# Patient Record
Sex: MALE | Race: WHITE | Employment: OTHER | URBAN - METROPOLITAN AREA
[De-identification: names, ages, dates, MRNs, and addresses within clinical notes are randomized per-mention and may not be internally consistent; named-entity substitution may affect disease eponyms.]

---

## 2017-07-23 PROBLEM — J44.1 CHRONIC OBSTRUCTIVE PULMONARY DISEASE WITH ACUTE EXACERBATION (HCC): Status: ACTIVE | Noted: 2017-07-23

## 2017-07-23 PROBLEM — R07.9 CHEST PAIN: Status: ACTIVE | Noted: 2017-07-23

## 2020-08-01 ENCOUNTER — APPOINTMENT (OUTPATIENT)
Dept: GENERAL RADIOLOGY | Age: 65
DRG: 378 | End: 2020-08-01
Payer: MEDICARE

## 2020-08-01 ENCOUNTER — HOSPITAL ENCOUNTER (INPATIENT)
Age: 65
LOS: 4 days | Discharge: HOME | DRG: 378 | End: 2020-08-05
Attending: EMERGENCY MEDICINE | Admitting: INTERNAL MEDICINE
Payer: MEDICARE

## 2020-08-01 ENCOUNTER — APPOINTMENT (OUTPATIENT)
Dept: CT IMAGING | Age: 65
DRG: 378 | End: 2020-08-01
Payer: MEDICARE

## 2020-08-01 PROBLEM — K92.2 GI BLEED: Status: ACTIVE | Noted: 2020-08-01

## 2020-08-01 LAB
ABO/RH: NORMAL
ALBUMIN SERPL-MCNC: 3.9 G/DL (ref 3.5–5.2)
ALP BLD-CCNC: 46 U/L (ref 40–129)
ALT SERPL-CCNC: 14 U/L (ref 0–40)
AMMONIA: 45 UMOL/L (ref 16–60)
ANION GAP SERPL CALCULATED.3IONS-SCNC: 17 MMOL/L (ref 7–16)
ANTIBODY SCREEN: NORMAL
APTT: 26.7 SEC (ref 24.5–35.1)
AST SERPL-CCNC: 15 U/L (ref 0–39)
BACTERIA: NORMAL /HPF
BILIRUB SERPL-MCNC: 0.4 MG/DL (ref 0–1.2)
BILIRUBIN DIRECT: <0.2 MG/DL (ref 0–0.3)
BILIRUBIN URINE: NEGATIVE
BILIRUBIN, INDIRECT: NORMAL MG/DL (ref 0–1)
BLOOD, URINE: ABNORMAL
BUN BLDV-MCNC: 72 MG/DL (ref 8–23)
CALCIUM SERPL-MCNC: 9.3 MG/DL (ref 8.6–10.2)
CHLORIDE BLD-SCNC: 104 MMOL/L (ref 98–107)
CHP ED QC CHECK: YES
CLARITY: CLEAR
CO2: 19 MMOL/L (ref 22–29)
COLOR: YELLOW
CREAT SERPL-MCNC: 0.7 MG/DL (ref 0.7–1.2)
GFR AFRICAN AMERICAN: >60
GFR NON-AFRICAN AMERICAN: >60 ML/MIN/1.73
GLUCOSE BLD-MCNC: 111 MG/DL (ref 74–99)
GLUCOSE BLD-MCNC: 127 MG/DL
GLUCOSE URINE: NEGATIVE MG/DL
HCT VFR BLD CALC: 35.6 % (ref 37–54)
HCT VFR BLD CALC: 41.9 % (ref 37–54)
HEMOGLOBIN: 12.1 G/DL (ref 12.5–16.5)
HEMOGLOBIN: 14.5 G/DL (ref 12.5–16.5)
IMMATURE RETIC FRACT: 10.1 % (ref 2.3–13.4)
INR BLD: 1
INR BLD: 1.1
KETONES, URINE: ABNORMAL MG/DL
LACTIC ACID: 2.8 MMOL/L (ref 0.5–2.2)
LEUKOCYTE ESTERASE, URINE: NEGATIVE
LIPASE: 14 U/L (ref 13–60)
MAGNESIUM: 2.1 MG/DL (ref 1.6–2.6)
MCH RBC QN AUTO: 33.1 PG (ref 26–35)
MCHC RBC AUTO-ENTMCNC: 34.6 % (ref 32–34.5)
MCV RBC AUTO: 95.7 FL (ref 80–99.9)
METER GLUCOSE: 126 MG/DL (ref 74–99)
NITRITE, URINE: NEGATIVE
PDW BLD-RTO: 13.2 FL (ref 11.5–15)
PH UA: 5.5 (ref 5–9)
PHOSPHORUS: 2.3 MG/DL (ref 2.5–4.5)
PLATELET # BLD: 232 E9/L (ref 130–450)
PMV BLD AUTO: 12.6 FL (ref 7–12)
POTASSIUM SERPL-SCNC: 3.7 MMOL/L (ref 3.5–5)
PROCALCITONIN: 0.07 NG/ML (ref 0–0.08)
PROTEIN UA: NEGATIVE MG/DL
PROTHROMBIN TIME: 12 SEC (ref 9.3–12.4)
PROTHROMBIN TIME: 12.4 SEC (ref 9.3–12.4)
RBC # BLD: 4.38 E12/L (ref 3.8–5.8)
RBC UA: NORMAL /HPF (ref 0–2)
RETIC HGB EQUIVALENT: 37.6 PG (ref 28.2–36.6)
RETICULOCYTE ABSOLUTE COUNT: 0.09 E12/L
RETICULOCYTE COUNT PCT: 2.5 % (ref 0.4–1.9)
SODIUM BLD-SCNC: 140 MMOL/L (ref 132–146)
SPECIFIC GRAVITY UA: <=1.005 (ref 1–1.03)
TOTAL CK: 106 U/L (ref 20–200)
TOTAL PROTEIN: 7.2 G/DL (ref 6.4–8.3)
TROPONIN: <0.01 NG/ML (ref 0–0.03)
UROBILINOGEN, URINE: 0.2 E.U./DL
WBC # BLD: 17.3 E9/L (ref 4.5–11.5)
WBC UA: NORMAL /HPF (ref 0–5)

## 2020-08-01 PROCEDURE — 99285 EMERGENCY DEPT VISIT HI MDM: CPT

## 2020-08-01 PROCEDURE — 86900 BLOOD TYPING SEROLOGIC ABO: CPT

## 2020-08-01 PROCEDURE — 83550 IRON BINDING TEST: CPT

## 2020-08-01 PROCEDURE — 81001 URINALYSIS AUTO W/SCOPE: CPT

## 2020-08-01 PROCEDURE — 6360000002 HC RX W HCPCS: Performed by: EMERGENCY MEDICINE

## 2020-08-01 PROCEDURE — 83540 ASSAY OF IRON: CPT

## 2020-08-01 PROCEDURE — 51798 US URINE CAPACITY MEASURE: CPT

## 2020-08-01 PROCEDURE — C9113 INJ PANTOPRAZOLE SODIUM, VIA: HCPCS | Performed by: EMERGENCY MEDICINE

## 2020-08-01 PROCEDURE — 96374 THER/PROPH/DIAG INJ IV PUSH: CPT

## 2020-08-01 PROCEDURE — 74177 CT ABD & PELVIS W/CONTRAST: CPT

## 2020-08-01 PROCEDURE — 94640 AIRWAY INHALATION TREATMENT: CPT

## 2020-08-01 PROCEDURE — 1200000000 HC SEMI PRIVATE

## 2020-08-01 PROCEDURE — 93005 ELECTROCARDIOGRAM TRACING: CPT | Performed by: EMERGENCY MEDICINE

## 2020-08-01 PROCEDURE — 86850 RBC ANTIBODY SCREEN: CPT

## 2020-08-01 PROCEDURE — 82962 GLUCOSE BLOOD TEST: CPT

## 2020-08-01 PROCEDURE — 85027 COMPLETE CBC AUTOMATED: CPT

## 2020-08-01 PROCEDURE — 82746 ASSAY OF FOLIC ACID SERUM: CPT

## 2020-08-01 PROCEDURE — 82728 ASSAY OF FERRITIN: CPT

## 2020-08-01 PROCEDURE — 83605 ASSAY OF LACTIC ACID: CPT

## 2020-08-01 PROCEDURE — 82550 ASSAY OF CK (CPK): CPT

## 2020-08-01 PROCEDURE — 82140 ASSAY OF AMMONIA: CPT

## 2020-08-01 PROCEDURE — 84484 ASSAY OF TROPONIN QUANT: CPT

## 2020-08-01 PROCEDURE — 85045 AUTOMATED RETICULOCYTE COUNT: CPT

## 2020-08-01 PROCEDURE — 6360000002 HC RX W HCPCS: Performed by: INTERNAL MEDICINE

## 2020-08-01 PROCEDURE — 84145 PROCALCITONIN (PCT): CPT

## 2020-08-01 PROCEDURE — 71046 X-RAY EXAM CHEST 2 VIEWS: CPT

## 2020-08-01 PROCEDURE — 85610 PROTHROMBIN TIME: CPT

## 2020-08-01 PROCEDURE — 86901 BLOOD TYPING SEROLOGIC RH(D): CPT

## 2020-08-01 PROCEDURE — 80048 BASIC METABOLIC PNL TOTAL CA: CPT

## 2020-08-01 PROCEDURE — 36415 COLL VENOUS BLD VENIPUNCTURE: CPT

## 2020-08-01 PROCEDURE — 94667 MNPJ CHEST WALL 1ST: CPT

## 2020-08-01 PROCEDURE — 2580000003 HC RX 258: Performed by: NURSE PRACTITIONER

## 2020-08-01 PROCEDURE — 84100 ASSAY OF PHOSPHORUS: CPT

## 2020-08-01 PROCEDURE — 70450 CT HEAD/BRAIN W/O DYE: CPT

## 2020-08-01 PROCEDURE — 72125 CT NECK SPINE W/O DYE: CPT

## 2020-08-01 PROCEDURE — 80076 HEPATIC FUNCTION PANEL: CPT

## 2020-08-01 PROCEDURE — 82607 VITAMIN B-12: CPT

## 2020-08-01 PROCEDURE — 83735 ASSAY OF MAGNESIUM: CPT

## 2020-08-01 PROCEDURE — 83690 ASSAY OF LIPASE: CPT

## 2020-08-01 PROCEDURE — 6360000002 HC RX W HCPCS: Performed by: NURSE PRACTITIONER

## 2020-08-01 PROCEDURE — 85018 HEMOGLOBIN: CPT

## 2020-08-01 PROCEDURE — 94760 N-INVAS EAR/PLS OXIMETRY 1: CPT

## 2020-08-01 PROCEDURE — 6360000004 HC RX CONTRAST MEDICATION: Performed by: RADIOLOGY

## 2020-08-01 PROCEDURE — 85730 THROMBOPLASTIN TIME PARTIAL: CPT

## 2020-08-01 PROCEDURE — C9113 INJ PANTOPRAZOLE SODIUM, VIA: HCPCS | Performed by: NURSE PRACTITIONER

## 2020-08-01 PROCEDURE — 94669 MECHANICAL CHEST WALL OSCILL: CPT

## 2020-08-01 RX ORDER — SODIUM CHLORIDE 0.9 % (FLUSH) 0.9 %
10 SYRINGE (ML) INJECTION PRN
Status: DISCONTINUED | OUTPATIENT
Start: 2020-08-01 | End: 2020-08-03 | Stop reason: SDUPTHER

## 2020-08-01 RX ORDER — ACETAMINOPHEN 650 MG/1
650 SUPPOSITORY RECTAL EVERY 6 HOURS PRN
Status: DISCONTINUED | OUTPATIENT
Start: 2020-08-01 | End: 2020-08-05 | Stop reason: HOSPADM

## 2020-08-01 RX ORDER — ONDANSETRON 2 MG/ML
4 INJECTION INTRAMUSCULAR; INTRAVENOUS EVERY 6 HOURS PRN
Status: DISCONTINUED | OUTPATIENT
Start: 2020-08-01 | End: 2020-08-01

## 2020-08-01 RX ORDER — SODIUM CHLORIDE AND POTASSIUM CHLORIDE .9; .15 G/100ML; G/100ML
SOLUTION INTRAVENOUS CONTINUOUS
Status: DISCONTINUED | OUTPATIENT
Start: 2020-08-01 | End: 2020-08-03

## 2020-08-01 RX ORDER — SODIUM CHLORIDE 0.9 % (FLUSH) 0.9 %
10 SYRINGE (ML) INJECTION PRN
Status: DISCONTINUED | OUTPATIENT
Start: 2020-08-01 | End: 2020-08-05 | Stop reason: HOSPADM

## 2020-08-01 RX ORDER — LIDOCAINE HYDROCHLORIDE 20 MG/ML
JELLY TOPICAL
Status: DISPENSED
Start: 2020-08-01 | End: 2020-08-02

## 2020-08-01 RX ORDER — SODIUM CHLORIDE 0.9 % (FLUSH) 0.9 %
10 SYRINGE (ML) INJECTION EVERY 12 HOURS SCHEDULED
Status: DISCONTINUED | OUTPATIENT
Start: 2020-08-01 | End: 2020-08-03 | Stop reason: SDUPTHER

## 2020-08-01 RX ORDER — TAMSULOSIN HYDROCHLORIDE 0.4 MG/1
0.4 CAPSULE ORAL DAILY
Status: DISCONTINUED | OUTPATIENT
Start: 2020-08-02 | End: 2020-08-05 | Stop reason: HOSPADM

## 2020-08-01 RX ORDER — PROMETHAZINE HYDROCHLORIDE 25 MG/1
12.5 TABLET ORAL EVERY 6 HOURS PRN
Status: DISCONTINUED | OUTPATIENT
Start: 2020-08-01 | End: 2020-08-01

## 2020-08-01 RX ORDER — ACETAMINOPHEN 325 MG/1
650 TABLET ORAL EVERY 6 HOURS PRN
Status: DISCONTINUED | OUTPATIENT
Start: 2020-08-01 | End: 2020-08-05 | Stop reason: HOSPADM

## 2020-08-01 RX ORDER — BUDESONIDE 0.5 MG/2ML
0.5 INHALANT ORAL 2 TIMES DAILY
Status: DISCONTINUED | OUTPATIENT
Start: 2020-08-01 | End: 2020-08-05 | Stop reason: HOSPADM

## 2020-08-01 RX ORDER — PROCHLORPERAZINE EDISYLATE 5 MG/ML
10 INJECTION INTRAMUSCULAR; INTRAVENOUS EVERY 6 HOURS PRN
Status: DISCONTINUED | OUTPATIENT
Start: 2020-08-01 | End: 2020-08-05 | Stop reason: HOSPADM

## 2020-08-01 RX ORDER — ARFORMOTEROL TARTRATE 15 UG/2ML
15 SOLUTION RESPIRATORY (INHALATION) 2 TIMES DAILY
Status: DISCONTINUED | OUTPATIENT
Start: 2020-08-01 | End: 2020-08-05 | Stop reason: HOSPADM

## 2020-08-01 RX ORDER — ALBUTEROL SULFATE 2.5 MG/3ML
2.5 SOLUTION RESPIRATORY (INHALATION) EVERY 4 HOURS PRN
Status: DISCONTINUED | OUTPATIENT
Start: 2020-08-01 | End: 2020-08-05 | Stop reason: HOSPADM

## 2020-08-01 RX ORDER — PANTOPRAZOLE SODIUM 40 MG/10ML
80 INJECTION, POWDER, LYOPHILIZED, FOR SOLUTION INTRAVENOUS ONCE
Status: COMPLETED | OUTPATIENT
Start: 2020-08-01 | End: 2020-08-01

## 2020-08-01 RX ADMIN — PANTOPRAZOLE SODIUM 80 MG: 40 INJECTION, POWDER, FOR SOLUTION INTRAVENOUS at 13:35

## 2020-08-01 RX ADMIN — ARFORMOTEROL TARTRATE 15 MCG: 15 SOLUTION RESPIRATORY (INHALATION) at 18:45

## 2020-08-01 RX ADMIN — SODIUM CHLORIDE 8 MG/HR: 9 INJECTION, SOLUTION INTRAVENOUS at 20:58

## 2020-08-01 RX ADMIN — POTASSIUM CHLORIDE AND SODIUM CHLORIDE: 900; 150 INJECTION, SOLUTION INTRAVENOUS at 18:05

## 2020-08-01 RX ADMIN — IOPAMIDOL 75 ML: 755 INJECTION, SOLUTION INTRAVENOUS at 15:16

## 2020-08-01 RX ADMIN — BUDESONIDE 500 MCG: 0.5 INHALANT RESPIRATORY (INHALATION) at 18:45

## 2020-08-01 ASSESSMENT — ENCOUNTER SYMPTOMS
COUGH: 0
EYE DISCHARGE: 0
EYE PAIN: 0
SHORTNESS OF BREATH: 1
SINUS PRESSURE: 0
BLOOD IN STOOL: 1
EYE REDNESS: 0
ABDOMINAL PAIN: 1
WHEEZING: 0
BACK PAIN: 0
NAUSEA: 0
SORE THROAT: 0
VOMITING: 0
DIARRHEA: 0

## 2020-08-01 ASSESSMENT — PAIN SCALES - GENERAL: PAINLEVEL_OUTOF10: 0

## 2020-08-01 NOTE — H&P
Department of Internal Medicine  Internal Medicine Consultation Note    Primary Care Physician: Vangie Becker MD   Admitting Physician:  Judie Mohr (in coverage for )  Admission date and time: 8/1/2020 12:29 PM    Room:  20/20  Admitting diagnosis: Acute Upper GI Bleed    Patient Name: Nasir Turcios  MRN: [id number]    Date of Service: 8/1/2020     Reason for consultation:  Melena and near syncope    HISTORY OF PRESENT ILLNESS:    Sonia Kinney is a 46-year-old  gentleman who presented to [address] [place] [address] emergency department today after having multiple bouts of melena and near syncope last evening. He states he awoke from sleep with the urgent need to use the restroom. When he got up to use the restroom he felt as though he may pass out. States that he had return of these symptoms of near syncope while at rest after returning from the bathroom as well as bouts of diaphoresis and lightheadedness. He admits to dark black liquid stools. He denies any known anticoagulant use. He does take a baby aspirin but no other antiplatelet agents. States he has been taking ibuprofen 2-3 times daily as per prescription of his doctor. His medication list is reviewed, he has no longer taking Toradol. He denies any known history of liver disease or esophageal varices. Does not recall any recent EGD or colonoscopy and does not follow with gastroenterologist locally. He is not a regular drinker of alcohol, stating he has been abstinent from alcohol for the last 30 years or so. He denies any tanya blood in the stools or any other history of gastrointestinal bleeding in the past, ulcerations or known GI malignancy.     PAST MEDICAL Hx:  Past Medical History:   Diagnosis Date    Amputation of leg ([address]     Chest tightness 07/23/2017    lexiscan stress test    Depression     Hypertension        PAST SURGICAL Hx:   Past Surgical History:   Procedure Laterality Date    ABOVE KNEE AMPUTATION         FAMILY Hx:  No family history on file. HOME MEDICATIONS:  Prior to Admission medications    Medication Sig Start Date End Date Taking? Authorizing Provider   ketorolac (TORADOL) 10 MG tablet Take 1 tablet by mouth every 6 hours as needed for Pain 7/24/17   Sheldon Hicks, DO   famotidine (PEPCID) 40 MG tablet Take 1 tablet by mouth every evening 7/24/17   Sheldon Hicks, DO   umeclidinium-vilanterol [facility] ELLIPTA) 62.5-25 MCG/INH AEPB inhaler Inhale 1 puff into the lungs daily 7/23/17   Sheldon Hicks, DO   traMADol (ULTRAM) 50 MG TABS Take 50 mg by mouth every 6 hours as needed 7/15/15   Riki Mays, DO   aspirin 81 MG chewable tablet Take 81 mg by mouth daily. Historical Provider, MD   acetaminophen (TYLENOL) 500 MG tablet Take 500 mg by mouth every 6 hours as needed. Historical Provider, MD   lisinopril (PRINIVIL;ZESTRIL) 10 MG tablet Take 10 mg by mouth daily. Historical Provider, MD   PARoxetine (PAXIL-CR) 12.5 MG CR tablet Take 12.5 mg by mouth every morning.       Historical Provider, MD       ALLERGIES:  Codeine and Clindamycin/lincomycin    SOCIAL Hx:  Social History     Socioeconomic History    Marital status:      Spouse name: Not on file    Number of children: Not on file    Years of education: Not on file    Highest education level: Not on file   Occupational History    Not on file   Social Needs    Financial resource strain: Not on file    Food insecurity     Worry: Not on file     Inability: Not on file    Transportation needs     Medical: Not on file     Non-medical: Not on file   Tobacco Use    Smoking status: Current Every Day Smoker     Types: Cigars    Tobacco comment: cigars   Substance and Sexual Activity    Alcohol use: Not on file    Drug use: Yes     Types: Marijuana    Sexual activity: Not on file   Lifestyle    Physical activity     Days per week: Not on file     Minutes per session: Not on file    Stress: Not on file   Relationships    Social connections CONSTITUTIONAL:    Awake, alert, cooperative, uncomfortable appearing without apparent distress, and appears stated age    EYES:    PERRL, EOMI, sclera clear without icterus, conjunctiva normal    ENT:    Normocephalic, atraumatic, sinuses nontender on palpation. External ears without lesions. Oral pharynx with moist mucus membranes. Tonsils without erythema or exudates. NECK:    Supple, symmetrical, trachea midline, no adenopathy, thyroid symmetric, not enlarged and no tenderness, skin normal, no bruits, no JVD    HEMATOLOGIC/LYMPHATICS:    No cervical lymphadenopathy and no supraclavicular lymphadenopathy    LUNGS:    Symmetric. No increased work of breathing, moderately diminished air exchange, clear to auscultation bilaterally, no wheezes, rhonchi, or rales,     CARDIOVASCULAR:    Normal apical impulse, regular rate and rhythm, normal S1 and S2, no S3 or S4, and systolic murmur noted    ABDOMEN:    Obese contour, ventral hernia noted with prior remote scarring, normal bowel sounds, soft, non-distended, non-tender, no masses palpated, no hepatosplenomegaly, no rebound or guarding elicited on palpation     MUSCULOSKELETAL:    Prior remote appearing amputation noted. There is no redness, warmth, or swelling of the joints. Full range of motion noted. Motor strength is 5 out of 5 all extremities bilaterally. Tone is normal.    NEUROLOGIC:    Awake, alert, oriented to name, place and time. Cranial nerves II-XII are grossly intact. Motor is 5 out of 5 bilaterally. SKIN:    Pale. No bruising or bleeding. No redness, warmth, or swelling    EXTREMITIES:    Peripheral pulses present. No edema, cyanosis, or swelling.     LABORATORY DATA:  CBC with Differential:    Lab Results   Component Value Date    WBC 17.3 08/01/2020    RBC 4.38 08/01/2020    HGB 14.5 08/01/2020    HCT 41.9 08/01/2020     08/01/2020    MCV 95.7 08/01/2020    MCH 33.1 08/01/2020    MCHC 34.6 08/01/2020    RDW 13.2 08/01/2020    LYMPHOPCT 25.4 07/21/2017    MONOPCT 8.6 07/21/2017    BASOPCT 0.6 07/21/2017    MONOSABS 1.09 07/21/2017    LYMPHSABS 3.23 07/21/2017    EOSABS 0.24 07/21/2017    BASOSABS 0.08 07/21/2017     BMP:    Lab Results   Component Value Date     08/01/2020    K 3.7 08/01/2020     08/01/2020    CO2 19 08/01/2020    BUN 72 08/01/2020    LABALBU 3.9 08/01/2020    CREATININE 0.7 08/01/2020    CALCIUM 9.3 08/01/2020    GFRAA >60 08/01/2020    LABGLOM >60 08/01/2020    GLUCOSE 127 08/01/2020     Troponin:    Lab Results   Component Value Date    TROPONINI <0.01 08/01/2020       ASSESSMENT:  · Acute upper GI bleeding with melena in the setting of daily nonsteroidal anti-inflammatory drug use  · Systemic inflammatory response syndrome without definite infectious process identified  · Hypertension, mild  · Essential hypertension  · COPD without acute exacerbation  · Chronic diastolic CHF without decompensation, with preserved LVEF    PLAN:  Aden Partida was admitted from the emergency department after being found to have acute gastrointestinal bleeding with melena which was confirmed by guaiac testing by the emergency department physician. He is mildly tachycardic and hypotensive with leukocytosis and this is felt to be reflective of his acute gastrointestinal bleeding. In the setting of NSAID usage, gastric or duodenal ulcerations are felt to be favored. Protonix bolus administered in ER and Protonix infusion will be continued with a stat consultation to GI. No history of varices or liver disease, will defer Sandostatin to GI. Hemoglobins will be monitored closely and patient will be transfused accordingly. Hemoglobin of 14.5 is likely not representative of equal abrasion presently. Laboratory evaluation is largely pending at this time.   Labs and vital signs will be monitored closely and will maintain low threshold for escalation of care to intermediate unit pending on patient's continued clinical course. Oral medications are being held and clear diet will be instituted today pending GI recommendations for timing of endoscopy. COPD and CHF appear to be without decompensation. Considering the patient's preserved ejection fraction, will monitor for the need of diuresis if transfuse rather than automatically provide. See additional orders for details.     JANY Hirsch  1:40 PM  8/1/2020    Electronically signed by CELESTINO Hirsch CNP on 8/1/20 at 1:40 PM EDT

## 2020-08-01 NOTE — ED PROVIDER NOTES
Patient reports last night, he was dizzy with standing. On several occasions, he had associated syncope and collapse to the ground. This morning, he continued to be dizzy. During a bowel movement, he noted black, tarry stool. No previous history of GI bleed. No anticoagulation. In addition to these complaints, he also describes some vague abdominal pain. This is also been present since last evening. No urinary symptoms. Also history of fairly severe emphysema requiring continuous oxygen at home. He does describe some exertional dyspnea, none at rest.  No sputum production. The history is provided by the patient. Abdominal Pain   Pain location:  LLQ  Pain quality: aching    Pain radiates to:  Does not radiate  Pain severity:  Mild  Onset quality:  Sudden  Duration:  1 day  Timing:  Constant  Progression:  Waxing and waning  Chronicity:  New  Relieved by:  None tried  Worsened by:  Nothing  Ineffective treatments:  None tried  Associated symptoms: fatigue and shortness of breath    Associated symptoms: no chest pain, no chills, no cough, no diarrhea, no dysuria, no fever, no nausea, no sore throat and no vomiting         Review of Systems   Constitutional: Positive for activity change and fatigue. Negative for chills and fever. HENT: Negative for ear pain, sinus pressure and sore throat. Eyes: Negative for pain, discharge and redness. Respiratory: Positive for shortness of breath. Negative for cough and wheezing. Cardiovascular: Negative for chest pain. Gastrointestinal: Positive for abdominal pain and blood in stool. Negative for diarrhea, nausea and vomiting. Genitourinary: Negative for dysuria and frequency. Musculoskeletal: Negative for arthralgias and back pain. Skin: Negative for rash and wound. Neurological: Negative for weakness and headaches. Hematological: Negative for adenopathy. All other systems reviewed and are negative.        Physical Exam  Vitals signs and nursing note reviewed. Constitutional:       General: He is not in acute distress. Appearance: He is well-developed. HENT:      Head: Normocephalic and atraumatic. Mouth/Throat:      Mouth: Mucous membranes are moist.   Eyes:      Pupils: Pupils are equal, round, and reactive to light. Neck:      Musculoskeletal: Normal range of motion and neck supple. Cardiovascular:      Rate and Rhythm: Regular rhythm. Tachycardia present. Heart sounds: Normal heart sounds. No murmur. Pulmonary:      Effort: Pulmonary effort is normal. Tachypnea present. No respiratory distress. Breath sounds: Normal breath sounds. No wheezing or rales. Abdominal:      General: Abdomen is flat. Bowel sounds are normal.      Palpations: Abdomen is soft. Tenderness: There is abdominal tenderness in the left lower quadrant. There is no right CVA tenderness, left CVA tenderness, guarding or rebound. Hernia: No hernia is present. Comments: Very ecchymosis on the right anterior lateral abdomen. No tenderness here. Mild tenderness present to the left mid and lower quadrant. Skin:     General: Skin is warm and dry. Neurological:      Mental Status: He is alert and oriented to person, place, and time. Cranial Nerves: No cranial nerve deficit.       Coordination: Coordination normal.          Procedures     MDM  Number of Diagnoses or Management Options  Acute urinary obstruction: new and requires workup  Gastrointestinal hemorrhage, unspecified gastrointestinal hemorrhage type: new and requires workup  Syncope and collapse: new and requires workup     Amount and/or Complexity of Data Reviewed  Clinical lab tests: ordered  Tests in the radiology section of CPT®: ordered  Decide to obtain previous medical records or to obtain history from someone other than the patient: yes  Discuss the patient with other providers: yes  Independent visualization of images, tracings, or specimens: yes    Risk of Complications, Morbidity, and/or Mortality  Presenting problems: high  Diagnostic procedures: moderate  Management options: moderate    Patient Progress  Patient progress: stable       EKG: This EKG is signed and interpreted by me. Rate: 111  Rhythm: Sinus  Interpretation: no acute changes  Comparison: stable as compared to patient's most recent EKG      I reviewed the this patient's previous medical records and notes within [facility]. Last admission was several years ago for chest pain. No acute process noted during that inpatient management session.            --------------------------------------------- PAST HISTORY ---------------------------------------------  Past Medical History:  has a past medical history of Amputation of leg ([address] Chest tightness, Depression, and Hypertension. Past Surgical History:  has a past surgical history that includes above knee amputation. Social History:  reports that he has been smoking cigars. He does not have any smokeless tobacco history on file. He reports current drug use. Drug: Marijuana. Family History: family history is not on file. The patients home medications have been reviewed.     Allergies: Codeine and Clindamycin/lincomycin    -------------------------------------------------- RESULTS -------------------------------------------------    LABS:  Results for orders placed or performed during the hospital encounter of 22/27/44   Basic metabolic panel   Result Value Ref Range    Sodium 140 132 - 146 mmol/L    Potassium 3.7 3.5 - 5.0 mmol/L    Chloride 104 98 - 107 mmol/L    CO2 19 (L) 22 - 29 mmol/L    Anion Gap 17 (H) 7 - 16 mmol/L    Glucose 111 (H) 74 - 99 mg/dL    BUN 72 (H) 8 - 23 mg/dL    CREATININE 0.7 0.7 - 1.2 mg/dL    GFR Non-African American >60 >=60 mL/min/1.73    GFR African American >60     Calcium 9.3 8.6 - 10.2 mg/dL   CBC   Result Value Ref Range    WBC 17.3 (H) 4.5 - 11.5 E9/L    RBC 4.38 3.80 - 5.80 E12/L    Hemoglobin 14.5 12.5 - 16.5 small vein. Pneumomediastinum not excluded. Recommend correlation with CT of the chest for further assessment. XR CHEST (2 VW)   Final Result   No acute cardiopulmonary process. ------------------------- NURSING NOTES AND VITALS REVIEWED ---------------------------  Date / Time Roomed:  8/1/2020 12:29 PM  ED Bed Assignment:  20/20    The nursing notes within the ED encounter and vital signs as below have been reviewed. Patient Vitals for the past 24 hrs:   BP Temp Pulse Resp SpO2 Weight   08/01/20 1523 95/72 -- 83 18 94 % --   08/01/20 1409 112/78 98.5 °F (36.9 °C) 116 18 95 % --   08/01/20 1245 95/67 98.9 °F (37.2 °C) 105 18 98 % 170 lb (77.1 kg)       Oxygen Saturation Interpretation: Normal    ------------------------------------------ PROGRESS NOTES ------------------------------------------  Re-evaluation(s):  Time: 8098  Patients symptoms show no change  Repeat physical examination is not changed    Counseling:  I have spoken with the patient and discussed todays results, in addition to providing specific details for the plan of care and counseling regarding the diagnosis and prognosis. Their questions are answered at this time and they are agreeable with the plan of admission.    --------------------------------- ADDITIONAL PROVIDER NOTES ---------------------------------  Consultations:  Time: 1620. Spoke with Z. Lindalou Goldberg for Dr Rehan Kapadia. .  Discussed case. They will admit the patient. This patient's ED course included: a personal history and physicial examination, multiple bedside re-evaluations, IV medications, cardiac monitoring and continuous pulse oximetry    This patient has remained hemodynamically stable during their ED course. Diagnosis:  1. Gastrointestinal hemorrhage, unspecified gastrointestinal hemorrhage type    2. Syncope and collapse    3. Acute urinary obstruction        Disposition:  Patient's disposition: Admit to telemetry  Patient's condition is stable. Michi Smith,   08/01/20 7967

## 2020-08-01 NOTE — ED NOTES
Pt aware of need for urine sample, says unable to provide one at this time     [address], RN  08/01/20 3563

## 2020-08-02 ENCOUNTER — ANESTHESIA (OUTPATIENT)
Dept: ENDOSCOPY | Age: 65
DRG: 378 | End: 2020-08-02
Payer: MEDICARE

## 2020-08-02 ENCOUNTER — ANESTHESIA EVENT (OUTPATIENT)
Dept: ENDOSCOPY | Age: 65
DRG: 378 | End: 2020-08-02
Payer: MEDICARE

## 2020-08-02 ENCOUNTER — APPOINTMENT (OUTPATIENT)
Dept: CT IMAGING | Age: 65
DRG: 378 | End: 2020-08-02
Payer: MEDICARE

## 2020-08-02 VITALS
RESPIRATION RATE: 25 BRPM | OXYGEN SATURATION: 98 % | SYSTOLIC BLOOD PRESSURE: 92 MMHG | DIASTOLIC BLOOD PRESSURE: 33 MMHG

## 2020-08-02 LAB
ANION GAP SERPL CALCULATED.3IONS-SCNC: 14 MMOL/L (ref 7–16)
BASOPHILS ABSOLUTE: 0.06 E9/L (ref 0–0.2)
BASOPHILS RELATIVE PERCENT: 0.4 % (ref 0–2)
BUN BLDV-MCNC: 48 MG/DL (ref 8–23)
CALCIUM SERPL-MCNC: 8.3 MG/DL (ref 8.6–10.2)
CHLORIDE BLD-SCNC: 106 MMOL/L (ref 98–107)
CO2: 20 MMOL/L (ref 22–29)
CREAT SERPL-MCNC: 0.7 MG/DL (ref 0.7–1.2)
EOSINOPHILS ABSOLUTE: 0.05 E9/L (ref 0.05–0.5)
EOSINOPHILS RELATIVE PERCENT: 0.3 % (ref 0–6)
FERRITIN: 126 NG/ML
FOLATE: 12.7 NG/ML (ref 4.8–24.2)
GFR AFRICAN AMERICAN: >60
GFR NON-AFRICAN AMERICAN: >60 ML/MIN/1.73
GLUCOSE BLD-MCNC: 98 MG/DL (ref 74–99)
HCT VFR BLD CALC: 27.7 % (ref 37–54)
HCT VFR BLD CALC: 29.5 % (ref 37–54)
HCT VFR BLD CALC: 29.9 % (ref 37–54)
HCT VFR BLD CALC: 31 % (ref 37–54)
HEMOGLOBIN: 10 G/DL (ref 12.5–16.5)
HEMOGLOBIN: 10.9 G/DL (ref 12.5–16.5)
HEMOGLOBIN: 9.2 G/DL (ref 12.5–16.5)
HEMOGLOBIN: 9.7 G/DL (ref 12.5–16.5)
IMMATURE GRANULOCYTES #: 0.08 E9/L
IMMATURE GRANULOCYTES %: 0.5 % (ref 0–5)
IRON SATURATION: 60 % (ref 20–55)
IRON: 161 MCG/DL (ref 59–158)
LYMPHOCYTES ABSOLUTE: 2.82 E9/L (ref 1.5–4)
LYMPHOCYTES RELATIVE PERCENT: 19.4 % (ref 20–42)
MAGNESIUM: 1.7 MG/DL (ref 1.6–2.6)
MCH RBC QN AUTO: 32.9 PG (ref 26–35)
MCHC RBC AUTO-ENTMCNC: 35.2 % (ref 32–34.5)
MCV RBC AUTO: 93.7 FL (ref 80–99.9)
MONOCYTES ABSOLUTE: 0.77 E9/L (ref 0.1–0.95)
MONOCYTES RELATIVE PERCENT: 5.3 % (ref 2–12)
NEUTROPHILS ABSOLUTE: 10.78 E9/L (ref 1.8–7.3)
NEUTROPHILS RELATIVE PERCENT: 74.1 % (ref 43–80)
PDW BLD-RTO: 13.2 FL (ref 11.5–15)
PHOSPHORUS: 2.3 MG/DL (ref 2.5–4.5)
PLATELET # BLD: 187 E9/L (ref 130–450)
PMV BLD AUTO: 12.3 FL (ref 7–12)
POTASSIUM SERPL-SCNC: 3.1 MMOL/L (ref 3.5–5)
RBC # BLD: 3.31 E12/L (ref 3.8–5.8)
SODIUM BLD-SCNC: 140 MMOL/L (ref 132–146)
TOTAL IRON BINDING CAPACITY: 267 MCG/DL (ref 250–450)
VITAMIN B-12: 402 PG/ML (ref 211–946)
WBC # BLD: 14.6 E9/L (ref 4.5–11.5)

## 2020-08-02 PROCEDURE — 3609012400 HC EGD TRANSORAL BIOPSY SINGLE/MULTIPLE: Performed by: INTERNAL MEDICINE

## 2020-08-02 PROCEDURE — 2580000003 HC RX 258: Performed by: NURSE ANESTHETIST, CERTIFIED REGISTERED

## 2020-08-02 PROCEDURE — 71260 CT THORAX DX C+: CPT

## 2020-08-02 PROCEDURE — 85018 HEMOGLOBIN: CPT

## 2020-08-02 PROCEDURE — 36415 COLL VENOUS BLD VENIPUNCTURE: CPT

## 2020-08-02 PROCEDURE — 0W3P8ZZ CONTROL BLEEDING IN GASTROINTESTINAL TRACT, VIA NATURAL OR ARTIFICIAL OPENING ENDOSCOPIC: ICD-10-PCS | Performed by: INTERNAL MEDICINE

## 2020-08-02 PROCEDURE — 2709999900 HC NON-CHARGEABLE SUPPLY: Performed by: INTERNAL MEDICINE

## 2020-08-02 PROCEDURE — 6360000002 HC RX W HCPCS: Performed by: INTERNAL MEDICINE

## 2020-08-02 PROCEDURE — 3609018200 HC EGD INJECTION SCLEROSIS ESOPHGL/GASTRIC VARICES: Performed by: INTERNAL MEDICINE

## 2020-08-02 PROCEDURE — 1200000000 HC SEMI PRIVATE

## 2020-08-02 PROCEDURE — 83735 ASSAY OF MAGNESIUM: CPT

## 2020-08-02 PROCEDURE — 88305 TISSUE EXAM BY PATHOLOGIST: CPT

## 2020-08-02 PROCEDURE — 94668 MNPJ CHEST WALL SBSQ: CPT

## 2020-08-02 PROCEDURE — 2720000010 HC SURG SUPPLY STERILE: Performed by: INTERNAL MEDICINE

## 2020-08-02 PROCEDURE — 85025 COMPLETE CBC W/AUTO DIFF WBC: CPT

## 2020-08-02 PROCEDURE — C9113 INJ PANTOPRAZOLE SODIUM, VIA: HCPCS | Performed by: NURSE PRACTITIONER

## 2020-08-02 PROCEDURE — 0DB68ZX EXCISION OF STOMACH, VIA NATURAL OR ARTIFICIAL OPENING ENDOSCOPIC, DIAGNOSTIC: ICD-10-PCS | Performed by: INTERNAL MEDICINE

## 2020-08-02 PROCEDURE — 2580000003 HC RX 258: Performed by: INTERNAL MEDICINE

## 2020-08-02 PROCEDURE — 6360000002 HC RX W HCPCS: Performed by: NURSE ANESTHETIST, CERTIFIED REGISTERED

## 2020-08-02 PROCEDURE — 7100000011 HC PHASE II RECOVERY - ADDTL 15 MIN: Performed by: INTERNAL MEDICINE

## 2020-08-02 PROCEDURE — 84100 ASSAY OF PHOSPHORUS: CPT

## 2020-08-02 PROCEDURE — 3700000001 HC ADD 15 MINUTES (ANESTHESIA): Performed by: INTERNAL MEDICINE

## 2020-08-02 PROCEDURE — 2580000003 HC RX 258: Performed by: NURSE PRACTITIONER

## 2020-08-02 PROCEDURE — 80048 BASIC METABOLIC PNL TOTAL CA: CPT

## 2020-08-02 PROCEDURE — 85014 HEMATOCRIT: CPT

## 2020-08-02 PROCEDURE — 6370000000 HC RX 637 (ALT 250 FOR IP): Performed by: STUDENT IN AN ORGANIZED HEALTH CARE EDUCATION/TRAINING PROGRAM

## 2020-08-02 PROCEDURE — 94669 MECHANICAL CHEST WALL OSCILL: CPT

## 2020-08-02 PROCEDURE — 6360000002 HC RX W HCPCS: Performed by: NURSE PRACTITIONER

## 2020-08-02 PROCEDURE — 6360000004 HC RX CONTRAST MEDICATION: Performed by: RADIOLOGY

## 2020-08-02 PROCEDURE — 3700000000 HC ANESTHESIA ATTENDED CARE: Performed by: INTERNAL MEDICINE

## 2020-08-02 PROCEDURE — 7100000010 HC PHASE II RECOVERY - FIRST 15 MIN: Performed by: INTERNAL MEDICINE

## 2020-08-02 PROCEDURE — 3609013000 HC EGD TRANSORAL CONTROL BLEEDING ANY METHOD: Performed by: INTERNAL MEDICINE

## 2020-08-02 PROCEDURE — 88342 IMHCHEM/IMCYTCHM 1ST ANTB: CPT

## 2020-08-02 PROCEDURE — 94640 AIRWAY INHALATION TREATMENT: CPT

## 2020-08-02 RX ORDER — METOCLOPRAMIDE HYDROCHLORIDE 5 MG/ML
10 INJECTION INTRAMUSCULAR; INTRAVENOUS ONCE
Status: COMPLETED | OUTPATIENT
Start: 2020-08-02 | End: 2020-08-02

## 2020-08-02 RX ORDER — POTASSIUM CHLORIDE 7.45 MG/ML
10 INJECTION INTRAVENOUS PRN
Status: DISCONTINUED | OUTPATIENT
Start: 2020-08-02 | End: 2020-08-05 | Stop reason: HOSPADM

## 2020-08-02 RX ORDER — SODIUM CHLORIDE 9 MG/ML
INJECTION, SOLUTION INTRAVENOUS CONTINUOUS PRN
Status: DISCONTINUED | OUTPATIENT
Start: 2020-08-02 | End: 2020-08-02 | Stop reason: SDUPTHER

## 2020-08-02 RX ORDER — MAGNESIUM SULFATE 1 G/100ML
1 INJECTION INTRAVENOUS PRN
Status: DISCONTINUED | OUTPATIENT
Start: 2020-08-02 | End: 2020-08-05 | Stop reason: HOSPADM

## 2020-08-02 RX ORDER — PROPOFOL 10 MG/ML
INJECTION, EMULSION INTRAVENOUS PRN
Status: DISCONTINUED | OUTPATIENT
Start: 2020-08-02 | End: 2020-08-02 | Stop reason: SDUPTHER

## 2020-08-02 RX ORDER — SODIUM CHLORIDE 0.9 % (FLUSH) 0.9 %
10 SYRINGE (ML) INJECTION EVERY 12 HOURS SCHEDULED
Status: DISCONTINUED | OUTPATIENT
Start: 2020-08-02 | End: 2020-08-05 | Stop reason: HOSPADM

## 2020-08-02 RX ORDER — SODIUM CHLORIDE 0.9 % (FLUSH) 0.9 %
10 SYRINGE (ML) INJECTION PRN
Status: DISCONTINUED | OUTPATIENT
Start: 2020-08-02 | End: 2020-08-05 | Stop reason: HOSPADM

## 2020-08-02 RX ORDER — EPINEPHRINE 1 MG/ML
INJECTION, SOLUTION, CONCENTRATE INTRAVENOUS PRN
Status: DISCONTINUED | OUTPATIENT
Start: 2020-08-02 | End: 2020-08-02 | Stop reason: ALTCHOICE

## 2020-08-02 RX ADMIN — ARFORMOTEROL TARTRATE 15 MCG: 15 SOLUTION RESPIRATORY (INHALATION) at 16:07

## 2020-08-02 RX ADMIN — PROPOFOL 20 MG: 10 INJECTION, EMULSION INTRAVENOUS at 12:48

## 2020-08-02 RX ADMIN — SODIUM CHLORIDE: 9 INJECTION, SOLUTION INTRAVENOUS at 12:35

## 2020-08-02 RX ADMIN — BUDESONIDE 500 MCG: 0.5 INHALANT RESPIRATORY (INHALATION) at 16:07

## 2020-08-02 RX ADMIN — PROPOFOL 60 MG: 10 INJECTION, EMULSION INTRAVENOUS at 12:45

## 2020-08-02 RX ADMIN — SODIUM CHLORIDE, PRESERVATIVE FREE 10 ML: 5 INJECTION INTRAVENOUS at 11:28

## 2020-08-02 RX ADMIN — POTASSIUM CHLORIDE 40 MEQ: 149 INJECTION, SOLUTION, CONCENTRATE INTRAVENOUS at 08:56

## 2020-08-02 RX ADMIN — METOCLOPRAMIDE 10 MG: 5 INJECTION, SOLUTION INTRAMUSCULAR; INTRAVENOUS at 11:28

## 2020-08-02 RX ADMIN — PROPOFOL 30 MG: 10 INJECTION, EMULSION INTRAVENOUS at 12:51

## 2020-08-02 RX ADMIN — PROPOFOL 20 MG: 10 INJECTION, EMULSION INTRAVENOUS at 12:49

## 2020-08-02 RX ADMIN — POTASSIUM CHLORIDE AND SODIUM CHLORIDE: 900; 150 INJECTION, SOLUTION INTRAVENOUS at 06:36

## 2020-08-02 RX ADMIN — TAMSULOSIN HYDROCHLORIDE 0.4 MG: 0.4 CAPSULE ORAL at 08:56

## 2020-08-02 RX ADMIN — SODIUM CHLORIDE 8 MG/HR: 9 INJECTION, SOLUTION INTRAVENOUS at 17:24

## 2020-08-02 RX ADMIN — IOPAMIDOL 75 ML: 755 INJECTION, SOLUTION INTRAVENOUS at 10:52

## 2020-08-02 RX ADMIN — PROPOFOL 10 MG: 10 INJECTION, EMULSION INTRAVENOUS at 12:56

## 2020-08-02 RX ADMIN — ARFORMOTEROL TARTRATE 15 MCG: 15 SOLUTION RESPIRATORY (INHALATION) at 05:53

## 2020-08-02 RX ADMIN — SODIUM CHLORIDE 8 MG/HR: 9 INJECTION, SOLUTION INTRAVENOUS at 05:26

## 2020-08-02 RX ADMIN — PROPOFOL 10 MG: 10 INJECTION, EMULSION INTRAVENOUS at 12:58

## 2020-08-02 RX ADMIN — BUDESONIDE 500 MCG: 0.5 INHALANT RESPIRATORY (INHALATION) at 05:53

## 2020-08-02 RX ADMIN — PROPOFOL 30 MG: 10 INJECTION, EMULSION INTRAVENOUS at 12:54

## 2020-08-02 RX ADMIN — PROPOFOL 60 MG: 10 INJECTION, EMULSION INTRAVENOUS at 12:47

## 2020-08-02 ASSESSMENT — PAIN SCALES - GENERAL
PAINLEVEL_OUTOF10: 0
PAINLEVEL_OUTOF10: 7
PAINLEVEL_OUTOF10: 0
PAINLEVEL_OUTOF10: 0

## 2020-08-02 ASSESSMENT — PAIN DESCRIPTION - PROGRESSION: CLINICAL_PROGRESSION: GRADUALLY WORSENING

## 2020-08-02 ASSESSMENT — PAIN - FUNCTIONAL ASSESSMENT: PAIN_FUNCTIONAL_ASSESSMENT: PREVENTS OR INTERFERES SOME ACTIVE ACTIVITIES AND ADLS

## 2020-08-02 ASSESSMENT — PAIN DESCRIPTION - DESCRIPTORS: DESCRIPTORS: ACHING

## 2020-08-02 ASSESSMENT — PAIN DESCRIPTION - ORIENTATION: ORIENTATION: MID

## 2020-08-02 ASSESSMENT — PAIN DESCRIPTION - PAIN TYPE: TYPE: ACUTE PAIN

## 2020-08-02 ASSESSMENT — PAIN DESCRIPTION - LOCATION: LOCATION: ABDOMEN

## 2020-08-02 ASSESSMENT — PAIN DESCRIPTION - FREQUENCY: FREQUENCY: CONTINUOUS

## 2020-08-02 ASSESSMENT — PAIN DESCRIPTION - ONSET: ONSET: PROGRESSIVE

## 2020-08-02 NOTE — ANESTHESIA PRE PROCEDURE
Department of Anesthesiology  Preprocedure Note       Name:  Michael Kaur   Age:  60 y.o.  :  1955                                          MRN:  [id number]         Date:  2020      Surgeon: Alfonso Pa):  Sadie Perales DO    Procedure: Procedure(s):  EGD ESOPHAGOGASTRODUODENOSCOPY    Medications prior to admission:   Prior to Admission medications    Medication Sig Start Date End Date Taking?  Authorizing Provider   Fluticasone Furoate-Vilanterol (BREO ELLIPTA IN) Inhale 1 puff into the lungs daily Pt is unsure of dose   Yes Historical Provider, MD   lisinopril (PRINIVIL;ZESTRIL) 10 MG tablet Take 20 mg by mouth daily states he takes 20/25 once per day    Historical Provider, MD       Current medications:    Current Facility-Administered Medications   Medication Dose Route Frequency Provider Last Rate Last Dose    potassium chloride 10 mEq/100 mL IVPB (Peripheral Line)  10 mEq Intravenous PRN Percilla Justina, APRN - CNP        potassium chloride 40 mEq in sodium chloride 0.9 % 500 mL IVPB  40 mEq Intravenous Once [facility],  mL/hr at 20 0856 40 mEq at 20 0856    sodium phosphate 12.33 mmol in dextrose 5 % 250 mL IVPB  0.16 mmol/kg Intravenous PRN Percilla Justina, APRN - CNP        Or    sodium phosphate 24.66 mmol in dextrose 5 % 250 mL IVPB  0.32 mmol/kg Intravenous PRN Percilla Justina, APRN - CNP        magnesium sulfate 1 g in dextrose 5% 100 mL IVPB  1 g Intravenous PRN Percilla Justina, APRN - CNP        sodium chloride flush 0.9 % injection 10 mL  10 mL Intravenous 2 times per day Sadie Perales DO   10 mL at 20 1128    sodium chloride flush 0.9 % injection 10 mL  10 mL Intravenous PRN Sadie Perales DO        sodium chloride flush 0.9 % injection 10 mL  10 mL Intravenous PRN Thao Robertson DO        budesonide (PULMICORT) nebulizer suspension 500 mcg  0.5 mg Nebulization BID Percilla Justina, APRN - CNP   500 mcg at 20 0553    albuterol (PROVENTIL) nebulizer solution 2.5 mg  2.5 mg Nebulization Q4H PRN CELESTINO Ly - CNP        Arformoterol Tartrate (BROVANA) nebulizer solution 15 mcg  15 mcg Nebulization BID Balta Yan APRN - CNP   15 mcg at 08/02/20 0553    sodium chloride flush 0.9 % injection 10 mL  10 mL Intravenous 2 times per day CELESTINO Ly - CNP        sodium chloride flush 0.9 % injection 10 mL  10 mL Intravenous PRN Balta Yan APRN - CNP        acetaminophen (TYLENOL) tablet 650 mg  650 mg Oral Q6H PRN Balta Yan APRN - CNP        Or    acetaminophen (TYLENOL) suppository 650 mg  650 mg Rectal Q6H PRN Balta Yan APRN - JO        pantoprazole (PROTONIX) 80 mg in sodium chloride 0.9 % 100 mL infusion  8 mg/hr Intravenous Continuous Balta Yan APRN - CNP 10 mL/hr at 08/02/20 0526 8 mg/hr at 08/02/20 0526    prochlorperazine (COMPAZINE) injection 10 mg  10 mg Intravenous Q6H PRN Yamilka Figueroaerer, DO        0.9% NaCl with KCl 20 mEq infusion   Intravenous Continuous Yamilka Emy, DO   Stopped at 08/02/20 0905    tamsulosin (FLOMAX) capsule 0.4 mg  0.4 mg Oral Daily Nidia Lynne MD   0.4 mg at 08/02/20 8967       Allergies:     Allergies   Allergen Reactions    Codeine Itching    Clindamycin/Lincomycin Rash       Problem List:    Patient Active Problem List   Diagnosis Code    Rotator cuff tear M75.100    Shoulder impingement M75.40    Shoulder pain M25.519    Trochanteric bursitis M70.60    Hypertension I10    Depression F32.9    Amputation of leg ([facility] [address] S88.919A    Chest pain R07.9    Chronic obstructive pulmonary disease with acute exacerbation ([facility] [address] J44.1    GI bleed K92.2       Past Medical History:        Diagnosis Date    Amputation of leg ([facility] [address]     Chest tightness 07/23/2017    lexiscan stress test    Depression     Hypertension        Past Surgical History:        Procedure Laterality Date    ABOVE KNEE AMPUTATION         Social History:    Social History     Tobacco Use    Smoking status: Current Every Day Smoker     Types: Cigars    Tobacco comment: cigars   Substance Use Topics    Alcohol use: Not on file                                Ready to quit: Not Answered  Counseling given: Not Answered  Comment: cigars      Vital Signs (Current):   Vitals:    08/01/20 2240 08/02/20 0222 08/02/20 0500 08/02/20 0845   BP: 108/62   99/60   Pulse: 117 90 88 83   Resp: 24   22   Temp: 98.1 °F (36.7 °C)   98.2 °F (36.8 °C)   TempSrc: Oral   Oral   SpO2: 96%   97%   Weight:                                                  BP Readings from Last 3 Encounters:   08/02/20 99/60   07/24/17 126/70   07/13/15 120/80       NPO Status:                                                                                 BMI:   Wt Readings from Last 3 Encounters:   08/01/20 170 lb (77.1 kg)   07/21/17 170 lb (77.1 kg)   07/13/15 145 lb (65.8 kg)     Body mass index is 28.29 kg/m². CBC:   Lab Results   Component Value Date    WBC 14.6 08/02/2020    RBC 3.31 08/02/2020    HGB 10.0 08/02/2020    HCT 29.5 08/02/2020    MCV 93.7 08/02/2020    RDW 13.2 08/02/2020     08/02/2020       CMP:   Lab Results   Component Value Date     08/02/2020    K 3.1 08/02/2020     08/02/2020    CO2 20 08/02/2020    BUN 48 08/02/2020    CREATININE 0.7 08/02/2020    GFRAA >60 08/02/2020    LABGLOM >60 08/02/2020    GLUCOSE 98 08/02/2020    PROT 7.2 08/01/2020    CALCIUM 8.3 08/02/2020    BILITOT 0.4 08/01/2020    ALKPHOS 46 08/01/2020    AST 15 08/01/2020    ALT 14 08/01/2020       POC Tests: No results for input(s): POCGLU, POCNA, POCK, POCCL, POCBUN, POCHEMO, POCHCT in the last 72 hours.     Coags:   Lab Results   Component Value Date    PROTIME 12.0 08/01/2020    INR 1.0 08/01/2020    APTT 26.7 08/01/2020       HCG (If Applicable): No results found for: PREGTESTUR, PREGSERUM, HCG, HCGQUANT     ABGs: No results found for: PHART, PO2ART, NRZ6OVU, TZH0AYZ, BEART, K5PHHAQU     Type & Screen (If Applicable):  No results found for: LABABO, [address]    Drug/Infectious Status (If Applicable):  No results found for: HIV, HEPCAB    COVID-19 Screening (If Applicable): No results found for: COVID19      Anesthesia Evaluation  Patient summary reviewed  Airway: Mallampati: III  TM distance: >3 FB   Neck ROM: full  Mouth opening: > = 3 FB Dental:          Pulmonary:   (+) COPD:  decreased breath sounds,                             Cardiovascular:    (+) hypertension:,         Rhythm: regular  Rate: normal           Beta Blocker:  No for medical reason and Not on Beta Blocker         Neuro/Psych:   (+) psychiatric history:depression/anxiety             GI/Hepatic/Renal: Neg GI/Hepatic/Renal ROS            Endo/Other: Negative Endo/Other ROS                    Abdominal:   (+) obese,     Abdomen: soft. Vascular:                                        Anesthesia Plan      MAC     ASA 3     (GI bleed hgb 10)  Induction: intravenous. Anesthetic plan and risks discussed with patient. Plan discussed with CRNA.                   Jerman Malik MD   8/2/2020

## 2020-08-02 NOTE — CONSULTS
Lali Pereyra M.D., Dr. Stevo Mcdonald M.D., Elvie Acosta D.O.,   Terrial Homans, M.D., Rupa Watts D.O., and Dr. Delmis Gupta M.D. Date:1:58 AM 8/2/2020    Rosalee Collado  60 y.o.  male    PE:  /62   Pulse 117   Temp 98.1 °F (36.7 °C) (Oral)   Resp 24   Wt 170 lb (77.1 kg)   SpO2 96%   BMI 28.29 kg/m²     Reason for consultation:  Melena and near syncope     HISTORY OF PRESENT ILLNESS:    Hugo Brunner is a 79-year-old  gentleman who presented to [address] [place] [address] emergency department today after having multiple bouts of melena and near syncope last evening. He states he awoke from sleep with the urgent need to use the restroom. When he got up to use the restroom he felt as though he may pass out. States that he had return of these symptoms of near syncope while at rest after returning from the bathroom as well as bouts of diaphoresis and lightheadedness. He admits to dark black liquid stools. He denies any known anticoagulant use. He does take a baby aspirin but no other antiplatelet agents. States he has been taking ibuprofen 2-3 times daily as per prescription of his doctor. He denies any known history of liver disease or esophageal varices. Denies EGD or colonoscopy in past at this time.       PAST MEDICAL Hx:  Past Medical History[]Expand by Default        Past Medical History:   Diagnosis Date    Amputation of leg ([facility] [address]      Chest tightness 07/23/2017     lexiscan stress test    Depression      Hypertension             PAST SURGICAL Hx:   Past Surgical History[]Expand by Default         Past Surgical History:   Procedure Laterality Date    ABOVE KNEE AMPUTATION               FAMILY Hx:  Family History[]Expand by Default   No family history on file.        HOME MEDICATIONS:  Home Medications[]Expand by Default           Prior to Admission medications    Medication Sig Start Date End Date Taking?  Authorizing Provider   ketorolac (TORADOL) 10 MG tablet Take 1 tablet by mouth every 6 hours as needed for Pain 7/24/17 Arzella Harvest, DO   famotidine (PEPCID) 40 MG tablet Take 1 tablet by mouth every evening 7/24/17 Arzella Harvest, DO   umeclidinium-vilanterol [facility] ELLIPTA) 62.5-25 MCG/INH AEPB inhaler Inhale 1 puff into the lungs daily 7/23/17 Arzella Harvest, DO   traMADol (ULTRAM) 50 MG TABS Take 50 mg by mouth every 6 hours as needed 7/15/15     Valora Berliner, DO   aspirin 81 MG chewable tablet Take 81 mg by mouth daily.       Historical Provider, MD   acetaminophen (TYLENOL) 500 MG tablet Take 500 mg by mouth every 6 hours as needed.         Historical Provider, MD   lisinopril (PRINIVIL;ZESTRIL) 10 MG tablet Take 10 mg by mouth daily.         Historical Provider, MD   PARoxetine (PAXIL-CR) 12.5 MG CR tablet Take 12.5 mg by mouth every morning.         Historical Provider, MD           ALLERGIES:  Codeine and Clindamycin/lincomycin     SOCIAL Hx:  Social History   []Expand by Default            Socioeconomic History    Marital status:        Spouse name: Not on file    Number of children: Not on file    Years of education: Not on file    Highest education level: Not on file   Occupational History    Not on file   Social Needs    Financial resource strain: Not on file    Food insecurity       Worry: Not on file       Inability: Not on file    Transportation needs       Medical: Not on file       Non-medical: Not on file   Tobacco Use    Smoking status: Current Every Day Smoker       Types: Cigars    Tobacco comment: cigars   Substance and Sexual Activity    Alcohol use: Not on file    Drug use:  Yes       Types: Marijuana    Sexual activity: Not on file   Lifestyle    Physical activity       Days per week: Not on file       Minutes per session: Not on file    Stress: Not on file   Relationships    Social connections       Talks on phone: Not on file       Gets together: Not on file       Attends

## 2020-08-02 NOTE — ANESTHESIA POSTPROCEDURE EVALUATION
Department of Anesthesiology  Postprocedure Note    Patient: Taylor Esteban  MRN: [id number]  YOB: 1955  Date of evaluation: 8/2/2020  Time:  1:33 PM     Procedure Summary     Date:  08/02/20 Room / Location:  [facility] 01 / [address]    Anesthesia Start:  7868 Anesthesia Stop:  1405    Procedures:       EGD CONTROL HEMORRHAGE (N/A )      EGD BIOPSY      EGD ESOPHAGOGASTRODUODENOSCOPY SCLEROTHERAPY Diagnosis:  (GI bleed)    Surgeon:  Rocio Mahoney DO Responsible Provider:  Arlen Prajapati MD    Anesthesia Type:  MAC ASA Status:  3          Anesthesia Type: No value filed. Ariella Phase I:      Ariella Phase II: Ariella Score: 9    Last vitals: Reviewed and per EMR flowsheets.        Anesthesia Post Evaluation    Patient location during evaluation: bedside  Patient participation: waiting for patient participation  Level of consciousness: lethargic  Pain score: 2  Airway patency: patent  Nausea & Vomiting: no nausea  Complications: no  Cardiovascular status: blood pressure returned to baseline  Respiratory status: acceptable  Hydration status: euvolemic

## 2020-08-02 NOTE — PROCEDURES
Jadon Nieves is a 60 y.o. male patient. 1. Gastrointestinal hemorrhage, unspecified gastrointestinal hemorrhage type    2. Syncope and collapse    3. Acute urinary obstruction      Past Medical History:   Diagnosis Date    Amputation of leg ([address]     Chest tightness 07/23/2017    lexiscan stress test    Depression     Hypertension      Blood pressure 99/60, pulse 83, temperature 98.2 °F (36.8 °C), temperature source Oral, resp. rate 22, weight 170 lb (77.1 kg), SpO2 97 %. Procedures    Procedure:  Esophagogastroduodenoscopy    Indication:  Anemia, GI Bleed, Abnormal CT esophagus with hiatal hernia, Dysphagia    Consent: Informed consent was obtained from the patient including and not limited to risk of perforation, aspiration of gastric contents or teeth, bleeding, infection, dental breakage, ileus, need for surgery, or worst case death. Sedation  MAC    Estimated Blood Loss -- < 5cc    Endoscope was advanced easily through mouth to second portion of duodenum      Oropharynx views are limited but grossly normal.    Esophagus:   Mucosa is normal down to the GEJ where there was LA D Ulcerative esophagitis with 3 distinct ulcers, more so on the cardia side as opposed to the esophageal side of GEJ. These were better seen in retroflexed view, 1 with visible vessel. These were treated with a total of 5cc of 1:10,000 Epi injection, treated with BICAP Gold Probe at 15 W with good coagulation and hemostasis, and I placed 1 resolution hemoclip over the visible vessel with good hemostasis. GEJ at ~35 cm. Stomach:   Antrum and Gastric body with moderate gastritis, biopsy taken. No fresh or old blood seen    Retroflexed views showed normal fundus and cardia with 4-5cm hiatal hernia with small cody erosions with no active bleeding. Ulcers per above seen and treated retroflexed position. Duodenum: Bulb with mild duodenitis, no ulcer or AVM seen.     Second portion of duodenum is normal.  No fresh of old blood. IMPRESSION AND PLAN:     1.  3 Ulcers at the GEJ on the cardia side, 1 with visible vessel which was likely bleeding source. These were treated with a total of 5cc of 1:10,000 Epi injection, treated with BICAP Gold Probe at 15 W with good coagulation and hemostasis, and I placed 1 resolution hemoclip over the visible vessel with good hemostasis    2.  4-5cm hiatal hernia with small cody erosions    3. Moderate gastritis with biopsy    4. Mild duodenitis    5. Will keep on protonix drip x 72 hours then BID x 8 weeks. Repeat EGD in 6-8 weeks to ensure healing and no underlying dysplasia. No NSAID's. Toro Aung for baby aspirin enteric coated in the am while upright for at least 4 hours with food if necessary. Full liquid diet today. Colonoscopy can likely be done as outpt. See orders and our service will follow. Follow up as outpatient in office, call [phone] to schedule for appointment.       DO Shon  8/2/2020  12:46 PM

## 2020-08-02 NOTE — PROGRESS NOTES
Immediately prior to the procedure the patient's History and Physical was reviewed- there are no changes with the current vitals. BP 99/60   Pulse 83   Temp 98.2 °F (36.8 °C) (Oral)   Resp 22   Wt 170 lb (77.1 kg)   SpO2 97%   BMI 28.29 kg/m²     No CP/SOB. Risks/benefits d/w pt. All questions answered. Proceed with EGD. CT chest reviewed. Pt know also admits to dysphagia to solids.     DO Shon  8/2/2020  12:45 PM

## 2020-08-02 NOTE — PROGRESS NOTES
Patient voided 300cc straw yellow colored  Urine. Bladder scan 14cc.  Also patient had loose medium sized bowel movement

## 2020-08-02 NOTE — PROGRESS NOTES
Internal Medicine Progress Note    [facility]    Dawit Anglin, F.A.C.OSerinaI. Kasia Perez D.O., ARYAOSerinaISerina Weinstein D.O. Bandar Martinez, MSN, APRN, NP-C  Dell Obrien. Artie Lofton, MSN, APRN-CNP     Primary Care Physician: Drake Valdez MD   Admitting Physician:  Josephine Zaragoza DO  Admission date and time: 8/1/2020 12:29 PM    Room:  [address] [place] [address]  Admitting diagnosis: GI bleed [K92.2]    Patient Name: Arvind Casillas  MRN: [id number]    Date of Service: 8/2/2020     Subjective:    Priti Dhillon is a 60 y.o. male who was seen and examined today,8/2/2020, at the bedside. He continued to have loose melena stools. GI team has evaluated and will plan for endoscopic evaluation tomorrow unless more significant bleeding occurs. Otherwise, he denies any acute complaints. No family present during my examination. Review of System:   Constitutional:   Denies fever or chills, weight loss or gain, fatigue or malaise. HEENT:   Denies ear pain, sore throat, sinus or eye problems. Cardiovascular:   Denies any chest pain, irregular heartbeats, or palpitations. Respiratory:   Denies shortness of breath, coughing, sputum production, hemoptysis, or wheezing. Gastrointestinal:   Admits to nausea without vomiting. Admits to continue to loosen stool output with melena. Genitourinary:    Denies any urgency, frequency, hematuria. Voiding  without difficulty. Extremities:   Denies lower extremity swelling, edema or cyanosis. Neurology:    Denies any headache or focal neurological deficits, Denies generalized weakness or memory difficulty. Psch:   Denies being anxious or depressed. Musculoskeletal:    Prior lower extremity irritation. Denies  myalgias, joint complaints or back pain. Integumentary:   Denies any rashes, ulcers, or excoriations. Denies bruising. Hematologic/Lymphatic:  Denies bruising or bleeding.     Physical Exam:  No intake/output data recorded. Intake/Output Summary (Last 24 hours) at 8/2/2020 0911  Last data filed at 8/1/2020 2249  Gross per 24 hour   Intake 493.5 ml   Output 300 ml   Net 193.5 ml   I/O last 3 completed shifts: In: 493.5 [P.O.:120; I.V.:373.5]  Out: 300 [Urine:300]  Patient Vitals for the past 96 hrs (Last 3 readings):   Weight   08/01/20 1245 170 lb (77.1 kg)     Vital Signs:   Blood pressure 99/60, pulse 83, temperature 98.2 °F (36.8 °C), temperature source Oral, resp. rate 22, weight 170 lb (77.1 kg), SpO2 97 %. General appearance:  Alert, responsive, oriented to person, place, and time. No acute distress. Head:  Normocephalic. No masses, lesions or tenderness. Eyes:  PERRLA. EOMI. Sclera clear without icterus. Buccal mucosa moist.  ENT:  Ears normal. Mucosa normal.  Neck:    Supple. Trachea midline. No thyromegaly. No JVD. No bruits. Heart:    Rhythm regular. Rate controlled. Systolic murmur. Lungs:    Symmetrical. Clear to auscultation bilaterally. Diminished. No wheezes. No rhonchi. No rales. Abdomen:   Soft. Rounded contour. Non-tender. Non-distended. Bowel sounds positive. No organomegaly or masses. No pain on palpation. Extremities:    Peripheral pulses present. No peripheral edema. No ulcers. No cyanosis. No clubbing. Neurologic:    Alert x 3. No focal deficit. Cranial nerves grossly intact. No focal weakness. Psych:   Behavior is normal. Mood appears normal. Speech is not rapid and/or pressured. Musculoskeletal:   Prior amputation site without complication. Spine ROM normal. Muscular strength intact. Gait not assessed. Integumentary:  No rashes  Skin normal color and texture.   Genitalia/Breast:  Deferred    Medication:  Scheduled Meds:   potassium chloride 40 mEq in sodium chloride 0.9% 500 mL IVPB  40 mEq Intravenous Once    budesonide  0.5 mg Nebulization BID    Arformoterol Tartrate  15 mcg Nebulization BID    sodium chloride flush  10 mL Intravenous 2 times per day    tamsulosin 0.4 mg Oral Daily     Continuous Infusions:   pantoprozole (PROTONIX) infusion 8 mg/hr (08/02/20 0526)    0.9% NaCl with KCl 20 mEq Stopped (08/02/20 0905)       Objective Data:  CBC with Differential:    Lab Results   Component Value Date    WBC 14.6 08/02/2020    RBC 3.31 08/02/2020    HGB 10.0 08/02/2020    HCT 29.5 08/02/2020     08/02/2020    MCV 93.7 08/02/2020    MCH 32.9 08/02/2020    MCHC 35.2 08/02/2020    RDW 13.2 08/02/2020    LYMPHOPCT 19.4 08/02/2020    MONOPCT 5.3 08/02/2020    BASOPCT 0.4 08/02/2020    MONOSABS 0.77 08/02/2020    LYMPHSABS 2.82 08/02/2020    EOSABS 0.05 08/02/2020    BASOSABS 0.06 08/02/2020     Hemoglobin/Hematocrit:    Lab Results   Component Value Date    HGB 10.0 08/02/2020    HCT 29.5 08/02/2020     BMP:    Lab Results   Component Value Date     08/02/2020    K 3.1 08/02/2020     08/02/2020    CO2 20 08/02/2020    BUN 48 08/02/2020    LABALBU 3.9 08/01/2020    CREATININE 0.7 08/02/2020    CALCIUM 8.3 08/02/2020    GFRAA >60 08/02/2020    LABGLOM >60 08/02/2020    GLUCOSE 98 08/02/2020       Wound Documentation:        Assessment:  · Acute upper GI bleeding with melena in the setting of daily nonsteroidal anti-inflammatory drug use  · Systemic inflammatory response syndrome without definite infectious process identified  · Hypokalemia   · Hypophosphatemia   · hypertension, mild  · Essential hypertension  · COPD without acute exacerbation  · Chronic diastolic CHF without decompensation, with preserved LVEF    Plan:   Jackie Gilman continued to have melena and his hemoglobin has trended downward 4.5 g.  He is maintained on a Protonix infusion and hemoglobins will be trended. GI team has provided consultation and pending any more significant bleeding will plan endoscopy tomorrow. Electrolyte abnormalities are being addressed. Vital signs have stabilized.   Chronic comorbidities are being addressed with blood pressure occasions being held in the setting of borderline hypotension on admission. Heart failure appears to be compensated. Continue current therapy. See orders for further plan of care. More than 50% of my  time was spent at the bedside counseling/coordinating care with the patient and/or family with face to face contact. This time was spent reviewing notes and laboratory data as well as instructing and counseling the patient. Time I spent with the family or surrogate(s) is included only if the patient was incapable of providing the necessary information or participating in medical decisions. I also discussed the differential diagnosis and all of the proposed management plans with the patient and individuals accompanying the patient. Aerlis Patino requires this high level of physician care and nursing on the IMC/Telemetry unit due the complexity of decision management and chance of rapid decline or death. Continued cardiac monitoring and higher level of nursing are required. I am readily available for any further decision-making and intervention.      CELESTINO Aguila - CNP  8/2/2020  9:11 AM

## 2020-08-03 LAB
ANION GAP SERPL CALCULATED.3IONS-SCNC: 11 MMOL/L (ref 7–16)
BASOPHILS ABSOLUTE: 0.06 E9/L (ref 0–0.2)
BASOPHILS RELATIVE PERCENT: 0.8 % (ref 0–2)
BUN BLDV-MCNC: 31 MG/DL (ref 8–23)
CALCIUM SERPL-MCNC: 7.9 MG/DL (ref 8.6–10.2)
CHLORIDE BLD-SCNC: 111 MMOL/L (ref 98–107)
CO2: 20 MMOL/L (ref 22–29)
CREAT SERPL-MCNC: 0.7 MG/DL (ref 0.7–1.2)
EKG ATRIAL RATE: 111 BPM
EKG Q-T INTERVAL: 446 MS
EKG QRS DURATION: 62 MS
EKG QTC CALCULATION (BAZETT): 606 MS
EKG R AXIS: 52 DEGREES
EKG T AXIS: 102 DEGREES
EKG VENTRICULAR RATE: 111 BPM
EOSINOPHILS ABSOLUTE: 0.11 E9/L (ref 0.05–0.5)
EOSINOPHILS RELATIVE PERCENT: 1.5 % (ref 0–6)
GFR AFRICAN AMERICAN: >60
GFR NON-AFRICAN AMERICAN: >60 ML/MIN/1.73
GLUCOSE BLD-MCNC: 92 MG/DL (ref 74–99)
HCT VFR BLD CALC: 24.6 % (ref 37–54)
HCT VFR BLD CALC: 24.6 % (ref 37–54)
HCT VFR BLD CALC: 25.9 % (ref 37–54)
HCT VFR BLD CALC: 26.2 % (ref 37–54)
HEMOGLOBIN: 8.3 G/DL (ref 12.5–16.5)
HEMOGLOBIN: 8.4 G/DL (ref 12.5–16.5)
HEMOGLOBIN: 8.5 G/DL (ref 12.5–16.5)
HEMOGLOBIN: 8.7 G/DL (ref 12.5–16.5)
IMMATURE GRANULOCYTES #: 0.03 E9/L
IMMATURE GRANULOCYTES %: 0.4 % (ref 0–5)
LYMPHOCYTES ABSOLUTE: 1.78 E9/L (ref 1.5–4)
LYMPHOCYTES RELATIVE PERCENT: 23.9 % (ref 20–42)
MAGNESIUM: 1.7 MG/DL (ref 1.6–2.6)
MCH RBC QN AUTO: 33.3 PG (ref 26–35)
MCHC RBC AUTO-ENTMCNC: 34.6 % (ref 32–34.5)
MCV RBC AUTO: 96.5 FL (ref 80–99.9)
MONOCYTES ABSOLUTE: 0.49 E9/L (ref 0.1–0.95)
MONOCYTES RELATIVE PERCENT: 6.6 % (ref 2–12)
NEUTROPHILS ABSOLUTE: 4.97 E9/L (ref 1.8–7.3)
NEUTROPHILS RELATIVE PERCENT: 66.8 % (ref 43–80)
PDW BLD-RTO: 13.8 FL (ref 11.5–15)
PHOSPHORUS: 1.6 MG/DL (ref 2.5–4.5)
PLATELET # BLD: 134 E9/L (ref 130–450)
PMV BLD AUTO: 12.3 FL (ref 7–12)
POTASSIUM SERPL-SCNC: 3.5 MMOL/L (ref 3.5–5)
RBC # BLD: 2.55 E12/L (ref 3.8–5.8)
SODIUM BLD-SCNC: 142 MMOL/L (ref 132–146)
WBC # BLD: 7.4 E9/L (ref 4.5–11.5)

## 2020-08-03 PROCEDURE — 2580000003 HC RX 258: Performed by: INTERNAL MEDICINE

## 2020-08-03 PROCEDURE — 6360000002 HC RX W HCPCS: Performed by: NURSE PRACTITIONER

## 2020-08-03 PROCEDURE — 2580000003 HC RX 258: Performed by: NURSE PRACTITIONER

## 2020-08-03 PROCEDURE — 2500000003 HC RX 250 WO HCPCS: Performed by: INTERNAL MEDICINE

## 2020-08-03 PROCEDURE — 6360000002 HC RX W HCPCS: Performed by: INTERNAL MEDICINE

## 2020-08-03 PROCEDURE — 85018 HEMOGLOBIN: CPT

## 2020-08-03 PROCEDURE — 51798 US URINE CAPACITY MEASURE: CPT

## 2020-08-03 PROCEDURE — 93010 ELECTROCARDIOGRAM REPORT: CPT | Performed by: INTERNAL MEDICINE

## 2020-08-03 PROCEDURE — 80048 BASIC METABOLIC PNL TOTAL CA: CPT

## 2020-08-03 PROCEDURE — 6370000000 HC RX 637 (ALT 250 FOR IP): Performed by: NURSE PRACTITIONER

## 2020-08-03 PROCEDURE — 85014 HEMATOCRIT: CPT

## 2020-08-03 PROCEDURE — 1200000000 HC SEMI PRIVATE

## 2020-08-03 PROCEDURE — 83735 ASSAY OF MAGNESIUM: CPT

## 2020-08-03 PROCEDURE — 6370000000 HC RX 637 (ALT 250 FOR IP): Performed by: STUDENT IN AN ORGANIZED HEALTH CARE EDUCATION/TRAINING PROGRAM

## 2020-08-03 PROCEDURE — 36415 COLL VENOUS BLD VENIPUNCTURE: CPT

## 2020-08-03 PROCEDURE — 84100 ASSAY OF PHOSPHORUS: CPT

## 2020-08-03 PROCEDURE — 85025 COMPLETE CBC W/AUTO DIFF WBC: CPT

## 2020-08-03 PROCEDURE — 94640 AIRWAY INHALATION TREATMENT: CPT

## 2020-08-03 PROCEDURE — C9113 INJ PANTOPRAZOLE SODIUM, VIA: HCPCS | Performed by: NURSE PRACTITIONER

## 2020-08-03 RX ADMIN — ACETAMINOPHEN 650 MG: 325 TABLET, FILM COATED ORAL at 16:02

## 2020-08-03 RX ADMIN — SODIUM PHOSPHATE, MONOBASIC, MONOHYDRATE 12.33 MMOL: 276; 142 INJECTION, SOLUTION INTRAVENOUS at 13:54

## 2020-08-03 RX ADMIN — ACETAMINOPHEN 650 MG: 325 TABLET, FILM COATED ORAL at 22:06

## 2020-08-03 RX ADMIN — SODIUM CHLORIDE, PRESERVATIVE FREE 10 ML: 5 INJECTION INTRAVENOUS at 22:08

## 2020-08-03 RX ADMIN — ALBUTEROL SULFATE 2.5 MG: 2.5 SOLUTION RESPIRATORY (INHALATION) at 04:37

## 2020-08-03 RX ADMIN — ARFORMOTEROL TARTRATE 15 MCG: 15 SOLUTION RESPIRATORY (INHALATION) at 04:37

## 2020-08-03 RX ADMIN — SODIUM CHLORIDE 8 MG/HR: 9 INJECTION, SOLUTION INTRAVENOUS at 04:19

## 2020-08-03 RX ADMIN — ARFORMOTEROL TARTRATE 15 MCG: 15 SOLUTION RESPIRATORY (INHALATION) at 16:45

## 2020-08-03 RX ADMIN — BUDESONIDE 500 MCG: 0.5 INHALANT RESPIRATORY (INHALATION) at 16:45

## 2020-08-03 RX ADMIN — TAMSULOSIN HYDROCHLORIDE 0.4 MG: 0.4 CAPSULE ORAL at 08:09

## 2020-08-03 RX ADMIN — BUDESONIDE 500 MCG: 0.5 INHALANT RESPIRATORY (INHALATION) at 04:37

## 2020-08-03 RX ADMIN — POTASSIUM CHLORIDE AND SODIUM CHLORIDE: 900; 150 INJECTION, SOLUTION INTRAVENOUS at 00:41

## 2020-08-03 RX ADMIN — SODIUM CHLORIDE 8 MG/HR: 9 INJECTION, SOLUTION INTRAVENOUS at 14:03

## 2020-08-03 RX ADMIN — POTASSIUM CHLORIDE 40 MEQ: 149 INJECTION, SOLUTION, CONCENTRATE INTRAVENOUS at 13:54

## 2020-08-03 ASSESSMENT — PAIN DESCRIPTION - LOCATION: LOCATION: BACK

## 2020-08-03 ASSESSMENT — PAIN DESCRIPTION - PAIN TYPE: TYPE: ACUTE PAIN

## 2020-08-03 ASSESSMENT — PAIN SCALES - GENERAL
PAINLEVEL_OUTOF10: 3
PAINLEVEL_OUTOF10: 5
PAINLEVEL_OUTOF10: 2
PAINLEVEL_OUTOF10: 0
PAINLEVEL_OUTOF10: 8

## 2020-08-03 NOTE — PLAN OF CARE
Problem: Falls - Risk of:  Goal: Will remain free from falls  Description: Will remain free from falls  8/2/2020 2038 by Nestor Roberts RN  Outcome: Met This Shift  8/2/2020 1039 by Kera Quevedo RN  Outcome: Met This Shift  Goal: Absence of physical injury  Description: Absence of physical injury  8/2/2020 2038 by Nestor Roberts RN  Outcome: Met This Shift  8/2/2020 1039 by Kera Quevedo RN  Outcome: Met This Shift

## 2020-08-03 NOTE — PROGRESS NOTES
PROGRESS NOTE  By Tonia Vela M.D. [facility]  Dr. Mahsa Narayan M.D.,  Dr. Chayo Stinson M.D.,   Dr. Antonia Garcia D.O.,  Dr. Julien Shahid M.D.,  Dr Lilian Sins R Penrose  60 y.o.  male    SUBJECTIVE:  Denies abdominal pain. Denies nausea vomiting    OBJECTIVE:    /63   Pulse 84   Temp 97.7 °F (36.5 °C) (Oral)   Resp 18   Wt 170 lb (77.1 kg)   SpO2 97%   BMI 28.29 kg/m²     General: Obese  male. NAD  HEENT: Anicteric sclera/moist oral mucosa  Neck: Supple with trachea midline  Chest: Symmetrical excursion/nonlabored respirations  Abd.: Soft and obese. Nontender  Extr.:  No peripheral edema  Skin: Warm and dry        DATA:         Lab Results   Component Value Date    WBC 7.4 08/03/2020    RBC 2.55 08/03/2020    HGB 8.5 08/03/2020    HCT 24.6 08/03/2020    MCV 96.5 08/03/2020    MCH 33.3 08/03/2020    MCHC 34.6 08/03/2020    RDW 13.8 08/03/2020     08/03/2020    MPV 12.3 08/03/2020     Lab Results   Component Value Date     08/03/2020    K 3.5 08/03/2020     08/03/2020    CO2 20 08/03/2020    BUN 31 08/03/2020    CREATININE 0.7 08/03/2020    CALCIUM 7.9 08/03/2020    PROT 7.2 08/01/2020    LABALBU 3.9 08/01/2020    BILITOT 0.4 08/01/2020    ALKPHOS 46 08/01/2020    AST 15 08/01/2020    ALT 14 08/01/2020     Lab Results   Component Value Date    LIPASE 14 08/01/2020     No results found for: AMYLASE      ASSESSMENT/PLAN:    1. Melena/GI bleed/anemia  -EGD 8/2 revealing-ulcerative esophagitis with 3 distinct ulcers  -Continue PPI for total 72 hours then twice a day for 8 weeks  -Plan for repeat EGD in 6 to 8 weeks to ensure healing   -Advance diet     2. CRC Screening   -Plan for outpatient colonoscopy    3. Abnormal CT  -Radiology report states no pneumomediastinum        NOTE:  This report was transcribed using voice recognition software.   Every effort was made to ensure accuracy; however, inadvertent computerized

## 2020-08-03 NOTE — CARE COORDINATION
8/3/2020 1100 CM note: NO COVID TESTING. Met with pt in room for transition of care needs. Pt resides with his sister. Pt is fairly independent,drives. He has RBKA, and uses crutches. No other DME. PCP is Dr Edna Garcia, uses [address]. Plan is home, sister will provide ride. No needs identified.  Zoë APPIAH

## 2020-08-03 NOTE — PROGRESS NOTES
Internal Medicine Progress Note    [facility]    Melly Swanson. Paulie Torrez, MARIBEL.RITESH.MAZINOSerinaI. Dottie Avalos D.O., ARYAOMATTEO Bowman D.O. Sami Cheatham, MSN, APRN, NP-C  Rose Scruggsor. Elier Brown, MSN, APRN-CNP     Primary Care Physician: Tawnya Mckenzie MD   Admitting Physician:  Helena Cervantes DO  Admission date and time: 8/1/2020 12:29 PM    Room:  [facility]/8285-03  Admitting diagnosis: GI bleed [K92.2]    Patient Name: Haroldo Cunningham  MRN: [id number]    Date of Service: 8/3/2020     Subjective:  Gillian Mathias is a 60 y.o. male who was seen and examined today,8/3/2020, at the bedside. EGD results have been reviewed with the patient. He continues to have mild melanotic stool but is feeling somewhat better today. He is tolerating a full liquid diet. We discussed maintaining Protonix infusion for an additional 24 hours with probable discharge home tomorrow. Review of System:   Constitutional:   Denies fever or chills, weight loss or gain, fatigue or malaise. HEENT:   Denies ear pain, sore throat, sinus or eye problems. Cardiovascular:   Denies any chest pain, irregular heartbeats, or palpitations. Respiratory:   Denies shortness of breath, coughing, sputum production, hemoptysis, or wheezing. Gastrointestinal:   Mild abdominal discomfort with nausea. He is tolerating a full liquid diet. He continues to have some melanotic stool which is to be expected. Genitourinary:    Denies any urgency, frequency, hematuria. Voiding  without difficulty. Extremities:   Denies lower extremity swelling, edema or cyanosis. Neurology:    Denies any headache or focal neurological deficits, Denies generalized weakness or memory difficulty. Psch:   Denies being anxious or depressed. Musculoskeletal:    Prior lower extremity irritation. Denies  myalgias, joint complaints or back pain. Integumentary:   Denies any rashes, ulcers, or excoriations.   Denies bruising. Hematologic/Lymphatic:  Denies bruising or bleeding. Physical Exam:  No intake/output data recorded. Intake/Output Summary (Last 24 hours) at 8/3/2020 0949  Last data filed at 8/3/2020 0340  Gross per 24 hour   Intake 1068.5 ml   Output 200 ml   Net 868.5 ml   I/O last 3 completed shifts: In: 1068.5 [P.O.:60; I.V.:1008.5]  Out: 200 [Urine:200]  Patient Vitals for the past 96 hrs (Last 3 readings):   Weight   08/01/20 1245 170 lb (77.1 kg)     Vital Signs:   Blood pressure (!) 100/52, pulse 110, temperature 97.9 °F (36.6 °C), temperature source Oral, resp. rate 16, weight 170 lb (77.1 kg), SpO2 95 %. General appearance:  Alert, responsive, oriented to person, place, and time. No acute distress. Head:  Normocephalic. No masses, lesions or tenderness. Eyes:  PERRLA. EOMI. Sclera clear without icterus. Buccal mucosa moist.  ENT:  Ears normal. Mucosa normal.  Neck:    Supple. Trachea midline. No thyromegaly. No JVD. No bruits. Heart:    Rhythm regular. Rate controlled. Systolic murmur. Lungs:    Symmetrical. Clear to auscultation bilaterally. Diminished. No wheezes. No rhonchi. No rales. Abdomen:   Soft. Rounded contour. Non-tender. Non-distended. Bowel sounds positive. No organomegaly or masses. No pain on palpation. Extremities:    Peripheral pulses present. No peripheral edema. No ulcers. No cyanosis. No clubbing. Neurologic:    Alert x 3. No focal deficit. Cranial nerves grossly intact. No focal weakness. Psych:   Behavior is normal. Mood appears normal. Speech is not rapid and/or pressured. Musculoskeletal:   Prior amputation site without complication. Spine ROM normal. Muscular strength intact. Gait not assessed. Integumentary:  No rashes  Skin normal color and texture.   Genitalia/Breast:  Deferred    Medication:  Scheduled Meds:   sodium chloride flush  10 mL Intravenous 2 times per day    budesonide  0.5 mg Nebulization BID    Arformoterol Tartrate  15 mcg Nebulization BID    sodium chloride flush  10 mL Intravenous 2 times per day    tamsulosin  0.4 mg Oral Daily     Continuous Infusions:   pantoprozole (PROTONIX) infusion 8 mg/hr (08/03/20 0419)       Objective Data:  CBC with Differential:    Lab Results   Component Value Date    WBC 7.4 08/03/2020    RBC 2.55 08/03/2020    HGB 8.5 08/03/2020    HCT 24.6 08/03/2020     08/03/2020    MCV 96.5 08/03/2020    MCH 33.3 08/03/2020    MCHC 34.6 08/03/2020    RDW 13.8 08/03/2020    LYMPHOPCT 23.9 08/03/2020    MONOPCT 6.6 08/03/2020    BASOPCT 0.8 08/03/2020    MONOSABS 0.49 08/03/2020    LYMPHSABS 1.78 08/03/2020    EOSABS 0.11 08/03/2020    BASOSABS 0.06 08/03/2020     Hemoglobin/Hematocrit:    Lab Results   Component Value Date    HGB 8.5 08/03/2020    HCT 24.6 08/03/2020     BMP:    Lab Results   Component Value Date     08/03/2020    K 3.5 08/03/2020     08/03/2020    CO2 20 08/03/2020    BUN 31 08/03/2020    LABALBU 3.9 08/01/2020    CREATININE 0.7 08/03/2020    CALCIUM 7.9 08/03/2020    GFRAA >60 08/03/2020    LABGLOM >60 08/03/2020    GLUCOSE 92 08/03/2020         Assessment:  1. Peptic ulcer disease precipitated by nonsteroidal anti-inflammatory usage as an outpatient  2. Multiple electrolyte derangements including hypokalemia and hypophosphatemia  3. Essential hypertension  4. COPD without acute exacerbation  5. Chronic diastolic CHF without decompensation, with preserved LVEF    Plan:   EGD results have been reviewed with the patient. We will maintain the Protonix infusion for an additional 24 hours with plans to transition to oral Protonix therapy thereafter. He is to avoid nonsteroidal anti-inflammatory usage moving forward. Diet will be advanced at the discretion of the GI team.  Laboratory values and vital signs are otherwise stable. Electrolyte derangements will be addressed. We discussed becoming more ambulatory today. I anticipate discharging the patient home tomorrow.     More than 50% of my  time was spent at the bedside counseling/coordinating care with the patient and/or family with face to face contact. This time was spent reviewing notes and laboratory data as well as instructing and counseling the patient. Time I spent with the family or surrogate(s) is included only if the patient was incapable of providing the necessary information or participating in medical decisions. I also discussed the differential diagnosis and all of the proposed management plans with the patient and individuals accompanying the patient. Taz Sanchez requires this high level of physician care and nursing on the IMC/Telemetry unit due the complexity of decision management and chance of rapid decline or death. Continued cardiac monitoring and higher level of nursing are required. I am readily available for any further decision-making and intervention.      Bethany Isaac DO  8/3/2020  9:49 AM

## 2020-08-03 NOTE — CONSULTS
8/3/2020 8:28 AM  Service: Urology  Group: [facility] (Brent/Brittney/Fab)    Julia Rico  [id number]     Chief Complaint:    BPH, incomplete bladder emptying     History of Present Illness: The patient is a 60 y.o. male patient who presented with complaints of melena and near syncope. He was admitted for acute GI bleeding with melena. He underwent EGD yesterday. While in the ED he had a CT abdomen pelvis performed that showed enlarged prostate with distended bladder. We were consulted for incomplete bladder emptying. He did void about 300cc in the ED and was bladder scanned for only 14cc. No sheehan was placed. He was started on Flomax. He states that he is voiding comfortably. Denies feelings of incomplete emptying, weak stream, gross hematuria, or dysuria at this time. He states that he has never seen a urologist in the past.  He denies any history of stone disease or family history of prostate cancer. Prior to admission he denies any bothersome urinary symptoms. He denies nocturia, weak stream, post void dribbling, gross hematuria, or incontinence.         Past Medical History:   Diagnosis Date    Amputation of leg ([address]     Chest tightness 07/23/2017    lexiscan stress test    Depression     Hypertension          Past Surgical History:   Procedure Laterality Date    ABOVE KNEE AMPUTATION      UPPER GASTROINTESTINAL ENDOSCOPY N/A 8/2/2020    EGD CONTROL HEMORRHAGE performed by Singh Garner DO at [facility]  8/2/2020    EGD BIOPSY performed by Singh Garner DO at [facility]  8/2/2020    EGD ESOPHAGOGASTRODUODENOSCOPY SCLEROTHERAPY performed by Singh Garner DO at [place] [address]       Medications Prior to Admission:    Medications Prior to Admission: Fluticasone Furoate-Vilanterol (BREO ELLIPTA IN), Inhale 1 puff into the lungs daily Pt is unsure of dose  [DISCONTINUED] ketorolac (TORADOL) 10 MG tablet, Take 1 tablet by mouth every 6 hours as needed for Pain  [DISCONTINUED] famotidine (PEPCID) 40 MG tablet, Take 1 tablet by mouth every evening  [DISCONTINUED] umeclidinium-vilanterol (ANORO ELLIPTA) 62.5-25 MCG/INH AEPB inhaler, Inhale 1 puff into the lungs daily  [DISCONTINUED] traMADol (ULTRAM) 50 MG TABS, Take 50 mg by mouth every 6 hours as needed  [DISCONTINUED] aspirin 81 MG chewable tablet, Take 81 mg by mouth daily. lisinopril (PRINIVIL;ZESTRIL) 10 MG tablet, Take 20 mg by mouth daily states he takes 20/25 once per day  [DISCONTINUED] PARoxetine (PAXIL-CR) 12.5 MG CR tablet, Take 12.5 mg by mouth every morning. Allergies:    Codeine and Clindamycin/lincomycin    Social History:    reports that he has been smoking cigars. He does not have any smokeless tobacco history on file. He reports current drug use. Drug: Marijuana. Family History:   Non-contributory to this Urological problem  family history is not on file. Review of Systems:  Constitutional: No fever or chills   Respiratory: negative for cough and hemoptysis  Cardiovascular: negative for chest pain and dyspnea  Gastrointestinal: negative for abdominal pain, nausea, or emesis. Positive for melena   Derm: negative for rash and skin lesion(s)  Neurological: negative for seizures and tremors  Musculoskeletal: Negative    Psychiatric: Negative   : As above in the HPI, otherwise negative  All other reviews are negative    Physical Exam:     Vitals:  BP (!) 100/52   Pulse 110   Temp 97.9 °F (36.6 °C) (Oral)   Resp 16   Wt 170 lb (77.1 kg)   SpO2 95%   BMI 28.29 kg/m²     General: sleeping, arouses to verbal stimuli, A&OX3, no acute distress   HEENT:  Normocephalic, atraumatic. Lungs:  Respirations symmetric and non-labored. Abdomen:  soft, nontender, no masses  Extremities:  No clubbing, cyanosis, or edema  Skin:  Warm and dry, no open lesions or rashes  Neuro:  There are no motor or sensory deficits in the 4 quadrant extremities   Rectal: deferred  Genitourinary:  No sheehan, jacky urine in urinal     Labs:     Recent Labs     08/01/20  1313  08/02/20  0251  08/02/20 2000 08/03/20  0158 08/03/20  0732   WBC 17.3*  --  14.6*  --   --   --  7.4   RBC 4.38  --  3.31*  --   --   --  2.55*   HGB 14.5   < > 10.9*   < > 9.2* 8.7* 8.5*   HCT 41.9   < > 31.0*   < > 27.7* 26.2* 24.6*   MCV 95.7  --  93.7  --   --   --  96.5   MCH 33.1  --  32.9  --   --   --  33.3   MCHC 34.6*  --  35.2*  --   --   --  34.6*   RDW 13.2  --  13.2  --   --   --  13.8     --  187  --   --   --  134   MPV 12.6*  --  12.3*  --   --   --  12.3*    < > = values in this interval not displayed. Recent Labs     08/01/20  1313 08/02/20  0251 08/03/20  0732   CREATININE 0.7 0.7 0.7       Imaging:   Narrative    EXAMINATION:    CT OF THE ABDOMEN AND PELVIS WITH CONTRAST 8/1/2020 3:16 pm         TECHNIQUE:    CT of the abdomen and pelvis was performed with the administration of    intravenous contrast. Multiplanar reformatted images are provided for review. Dose modulation, iterative reconstruction, and/or weight based adjustment of    the mA/kV was utilized to reduce the radiation dose to as low as reasonably    achievable.         COMPARISON:    July 21, 2017.         HISTORY:    ORDERING SYSTEM PROVIDED HISTORY: fall, abdominal LLQ pain/bruising, GI bleed    TECHNOLOGIST PROVIDED HISTORY:    Additional Contrast?->None    Reason for exam:->fall, abdominal LLQ pain/bruising, GI bleed         FINDINGS:    Lower Chest: The lung bases demonstrate moderate emphysematous changes. Heart size is unable to be accurately assessed.  Coronary artery    calcifications are present, potentially a marker for coronary artery disease. A small hiatal hernia is present.         Organs: The gallbladder is not dilated.  The liver enhances homogeneously.     The spleen and adrenal glands are normal in size.  The pancreas enhances    homogeneously.  There are scattered subcentimeter cortically based renal    lesions noted bilaterally.  The majority these are too small to accurately    characterize but likely reflects cysts.  No hydronephrosis.         GI/Bowel: No evidence of a bowel obstruction, free air or pneumatosis. Portions the colon are decompressed, limiting assessment for mucosal based    abnormalities. There is diverticulosis without evidence of diverticulitis. The appendix is not confidently visualized.  Surgical clips adjacent to the    cecum suggest prior appendectomy.         Pelvis: The urinary bladder is distended with circumferential wall    thickening.  This may be on the basis of chronic outlet obstruction due to    the enlarged prostate gland which measures 5.4 cm in transverse diameter. Scattered visible but nonenlarged pelvic lymph nodes are present.  No free    fluid is identified in the pelvis.  There is severe atrophy of the right    iliopsoas muscle.         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  There are severe atherosclerotic plaques throughout the abdominal    aorta.  No evidence of an abdominal aortic aneurysm.  No retroperitoneal    lymphadenopathy.         Bones/Soft Tissues:  Moderate degenerative changes involve the lower lumbar    spine.  There are partially imaged postsurgical changes in the proximal right    lower extremity which appear to be related to amputation of the right lower    extremity at the level of the proximal femoral shaft. Charlcie [place] is a chronic    fluid collection in the right thigh approximately measuring 2.9 x 3.6 cm, not    significantly changed dating back to 2017. Charlcie [place] are multiple large defects    in the anterior abdominal wall, including a ventral wall fat containing    hernia in the midline and also to the left of midline on axial image 73.  An    intramuscular lipoma measuring 3.8 cm is noted in the left rectus abdominus    musculature, stable in size.  A fat containing umbilical hernia is present.              Impression    No acute inflammatory obstructive process in the abdomen or pelvis.         Circumferential wall thickening of the urinary bladder which may be on the    basis of chronic outlet obstruction from the enlarged prostate gland.  Acute    or chronic cystitis not excluded.         Chronic changes as above. Assessment/plan:  BPH  Bilateral renal cyst  Microscopic hematuria    Urine cytology ordered   Bladder PVR done in the ER was 14cc  Okay to continue without Fajardo  Continue to monitor PVRs, nursing to call if PVR >300ml  Continue Flomax  Will need SAMM and PSA as an outpatient  No further  interventions are planned at this time  Please call with any further questions or concerns  Thank you for allowing us to participate in his care         Electronically signed by CELESTINO Morillo CNP on 8/3/2020 at 8:28 AM     The patient was seen and examined by me today. I agree with the assessment and plan of OTONIEL Morillo CNP.

## 2020-08-04 ENCOUNTER — APPOINTMENT (OUTPATIENT)
Dept: GENERAL RADIOLOGY | Age: 65
DRG: 378 | End: 2020-08-04
Payer: MEDICARE

## 2020-08-04 LAB
ANION GAP SERPL CALCULATED.3IONS-SCNC: 12 MMOL/L (ref 7–16)
BASOPHILS ABSOLUTE: 0.05 E9/L (ref 0–0.2)
BASOPHILS RELATIVE PERCENT: 0.6 % (ref 0–2)
BUN BLDV-MCNC: 25 MG/DL (ref 8–23)
CALCIUM SERPL-MCNC: 7.9 MG/DL (ref 8.6–10.2)
CHLORIDE BLD-SCNC: 111 MMOL/L (ref 98–107)
CO2: 19 MMOL/L (ref 22–29)
CREAT SERPL-MCNC: 0.7 MG/DL (ref 0.7–1.2)
EOSINOPHILS ABSOLUTE: 0.18 E9/L (ref 0.05–0.5)
EOSINOPHILS RELATIVE PERCENT: 2.1 % (ref 0–6)
GFR AFRICAN AMERICAN: >60
GFR NON-AFRICAN AMERICAN: >60 ML/MIN/1.73
GLUCOSE BLD-MCNC: 93 MG/DL (ref 74–99)
HCT VFR BLD CALC: 23.9 % (ref 37–54)
HCT VFR BLD CALC: 24.4 % (ref 37–54)
HCT VFR BLD CALC: 25.7 % (ref 37–54)
HCT VFR BLD CALC: 25.9 % (ref 37–54)
HEMOGLOBIN: 8 G/DL (ref 12.5–16.5)
HEMOGLOBIN: 8.2 G/DL (ref 12.5–16.5)
HEMOGLOBIN: 8.7 G/DL (ref 12.5–16.5)
HEMOGLOBIN: 8.8 G/DL (ref 12.5–16.5)
IMMATURE GRANULOCYTES #: 0.03 E9/L
IMMATURE GRANULOCYTES %: 0.4 % (ref 0–5)
LYMPHOCYTES ABSOLUTE: 2.3 E9/L (ref 1.5–4)
LYMPHOCYTES RELATIVE PERCENT: 27 % (ref 20–42)
MAGNESIUM: 1.8 MG/DL (ref 1.6–2.6)
MCH RBC QN AUTO: 32.5 PG (ref 26–35)
MCHC RBC AUTO-ENTMCNC: 32.8 % (ref 32–34.5)
MCV RBC AUTO: 99.2 FL (ref 80–99.9)
MONOCYTES ABSOLUTE: 0.49 E9/L (ref 0.1–0.95)
MONOCYTES RELATIVE PERCENT: 5.7 % (ref 2–12)
NEUTROPHILS ABSOLUTE: 5.48 E9/L (ref 1.8–7.3)
NEUTROPHILS RELATIVE PERCENT: 64.2 % (ref 43–80)
PDW BLD-RTO: 13.9 FL (ref 11.5–15)
PHOSPHORUS: 2.1 MG/DL (ref 2.5–4.5)
PLATELET # BLD: 134 E9/L (ref 130–450)
PMV BLD AUTO: 12.8 FL (ref 7–12)
POTASSIUM SERPL-SCNC: 3.7 MMOL/L (ref 3.5–5)
RBC # BLD: 2.46 E12/L (ref 3.8–5.8)
SODIUM BLD-SCNC: 142 MMOL/L (ref 132–146)
WBC # BLD: 8.5 E9/L (ref 4.5–11.5)

## 2020-08-04 PROCEDURE — 94640 AIRWAY INHALATION TREATMENT: CPT

## 2020-08-04 PROCEDURE — 83735 ASSAY OF MAGNESIUM: CPT

## 2020-08-04 PROCEDURE — 97530 THERAPEUTIC ACTIVITIES: CPT

## 2020-08-04 PROCEDURE — 85018 HEMOGLOBIN: CPT

## 2020-08-04 PROCEDURE — C9113 INJ PANTOPRAZOLE SODIUM, VIA: HCPCS | Performed by: HOSPITALIST

## 2020-08-04 PROCEDURE — 2580000003 HC RX 258: Performed by: INTERNAL MEDICINE

## 2020-08-04 PROCEDURE — 2500000003 HC RX 250 WO HCPCS: Performed by: INTERNAL MEDICINE

## 2020-08-04 PROCEDURE — 2580000003 HC RX 258: Performed by: HOSPITALIST

## 2020-08-04 PROCEDURE — 1200000000 HC SEMI PRIVATE

## 2020-08-04 PROCEDURE — 6360000002 HC RX W HCPCS: Performed by: HOSPITALIST

## 2020-08-04 PROCEDURE — 84100 ASSAY OF PHOSPHORUS: CPT

## 2020-08-04 PROCEDURE — 97116 GAIT TRAINING THERAPY: CPT | Performed by: PHYSICAL THERAPIST

## 2020-08-04 PROCEDURE — 97161 PT EVAL LOW COMPLEX 20 MIN: CPT | Performed by: PHYSICAL THERAPIST

## 2020-08-04 PROCEDURE — 6370000000 HC RX 637 (ALT 250 FOR IP): Performed by: INTERNAL MEDICINE

## 2020-08-04 PROCEDURE — 6370000000 HC RX 637 (ALT 250 FOR IP): Performed by: NURSE PRACTITIONER

## 2020-08-04 PROCEDURE — 36415 COLL VENOUS BLD VENIPUNCTURE: CPT

## 2020-08-04 PROCEDURE — 80048 BASIC METABOLIC PNL TOTAL CA: CPT

## 2020-08-04 PROCEDURE — 6370000000 HC RX 637 (ALT 250 FOR IP): Performed by: STUDENT IN AN ORGANIZED HEALTH CARE EDUCATION/TRAINING PROGRAM

## 2020-08-04 PROCEDURE — 6360000002 HC RX W HCPCS: Performed by: NURSE PRACTITIONER

## 2020-08-04 PROCEDURE — 74018 RADEX ABDOMEN 1 VIEW: CPT

## 2020-08-04 PROCEDURE — 97165 OT EVAL LOW COMPLEX 30 MIN: CPT

## 2020-08-04 PROCEDURE — 85025 COMPLETE CBC W/AUTO DIFF WBC: CPT

## 2020-08-04 PROCEDURE — 85014 HEMATOCRIT: CPT

## 2020-08-04 RX ORDER — POLYETHYLENE GLYCOL 3350 17 G/17G
17 POWDER, FOR SOLUTION ORAL 2 TIMES DAILY
Status: DISCONTINUED | OUTPATIENT
Start: 2020-08-04 | End: 2020-08-05 | Stop reason: HOSPADM

## 2020-08-04 RX ORDER — SODIUM CHLORIDE 9 MG/ML
10 INJECTION INTRAVENOUS 2 TIMES DAILY
Status: DISCONTINUED | OUTPATIENT
Start: 2020-08-04 | End: 2020-08-05 | Stop reason: HOSPADM

## 2020-08-04 RX ORDER — DOCUSATE SODIUM 100 MG/1
100 CAPSULE, LIQUID FILLED ORAL 2 TIMES DAILY
Status: DISCONTINUED | OUTPATIENT
Start: 2020-08-04 | End: 2020-08-05 | Stop reason: HOSPADM

## 2020-08-04 RX ORDER — PANTOPRAZOLE SODIUM 40 MG/10ML
40 INJECTION, POWDER, LYOPHILIZED, FOR SOLUTION INTRAVENOUS 2 TIMES DAILY
Status: DISCONTINUED | OUTPATIENT
Start: 2020-08-04 | End: 2020-08-05 | Stop reason: HOSPADM

## 2020-08-04 RX ADMIN — POLYETHYLENE GLYCOL 3350 17 G: 17 POWDER, FOR SOLUTION ORAL at 11:46

## 2020-08-04 RX ADMIN — ALBUTEROL SULFATE 2.5 MG: 2.5 SOLUTION RESPIRATORY (INHALATION) at 16:11

## 2020-08-04 RX ADMIN — BUDESONIDE 500 MCG: 0.5 INHALANT RESPIRATORY (INHALATION) at 16:11

## 2020-08-04 RX ADMIN — ACETAMINOPHEN 650 MG: 325 TABLET, FILM COATED ORAL at 04:07

## 2020-08-04 RX ADMIN — PANTOPRAZOLE SODIUM 40 MG: 40 INJECTION, POWDER, FOR SOLUTION INTRAVENOUS at 21:01

## 2020-08-04 RX ADMIN — DOCUSATE SODIUM 100 MG: 100 CAPSULE, LIQUID FILLED ORAL at 11:46

## 2020-08-04 RX ADMIN — ARFORMOTEROL TARTRATE 15 MCG: 15 SOLUTION RESPIRATORY (INHALATION) at 16:11

## 2020-08-04 RX ADMIN — BUDESONIDE 500 MCG: 0.5 INHALANT RESPIRATORY (INHALATION) at 05:28

## 2020-08-04 RX ADMIN — DOCUSATE SODIUM 100 MG: 100 CAPSULE, LIQUID FILLED ORAL at 21:01

## 2020-08-04 RX ADMIN — ARFORMOTEROL TARTRATE 15 MCG: 15 SOLUTION RESPIRATORY (INHALATION) at 05:28

## 2020-08-04 RX ADMIN — ACETAMINOPHEN 650 MG: 325 TABLET, FILM COATED ORAL at 21:01

## 2020-08-04 RX ADMIN — SODIUM CHLORIDE, PRESERVATIVE FREE 10 ML: 5 INJECTION INTRAVENOUS at 08:14

## 2020-08-04 RX ADMIN — ALBUTEROL SULFATE 2.5 MG: 2.5 SOLUTION RESPIRATORY (INHALATION) at 05:28

## 2020-08-04 RX ADMIN — SODIUM CHLORIDE 10 ML: 9 INJECTION INTRAMUSCULAR; INTRAVENOUS; SUBCUTANEOUS at 21:01

## 2020-08-04 RX ADMIN — ACETAMINOPHEN 650 MG: 325 TABLET, FILM COATED ORAL at 11:53

## 2020-08-04 RX ADMIN — TAMSULOSIN HYDROCHLORIDE 0.4 MG: 0.4 CAPSULE ORAL at 08:14

## 2020-08-04 RX ADMIN — SODIUM PHOSPHATE, MONOBASIC, MONOHYDRATE 12.33 MMOL: 276; 142 INJECTION, SOLUTION INTRAVENOUS at 12:45

## 2020-08-04 ASSESSMENT — PAIN SCALES - GENERAL
PAINLEVEL_OUTOF10: 7
PAINLEVEL_OUTOF10: 8
PAINLEVEL_OUTOF10: 0
PAINLEVEL_OUTOF10: 4
PAINLEVEL_OUTOF10: 6

## 2020-08-04 ASSESSMENT — PAIN DESCRIPTION - PROGRESSION: CLINICAL_PROGRESSION: NOT CHANGED

## 2020-08-04 ASSESSMENT — PAIN DESCRIPTION - PAIN TYPE: TYPE: CHRONIC PAIN;ACUTE PAIN

## 2020-08-04 ASSESSMENT — PAIN DESCRIPTION - DESCRIPTORS: DESCRIPTORS: ACHING;CRAMPING;DISCOMFORT

## 2020-08-04 ASSESSMENT — PAIN DESCRIPTION - FREQUENCY: FREQUENCY: CONTINUOUS

## 2020-08-04 ASSESSMENT — PAIN DESCRIPTION - ORIENTATION: ORIENTATION: MID

## 2020-08-04 ASSESSMENT — PAIN DESCRIPTION - ONSET: ONSET: PROGRESSIVE

## 2020-08-04 ASSESSMENT — PAIN DESCRIPTION - LOCATION: LOCATION: BACK

## 2020-08-04 NOTE — PROGRESS NOTES
Internal Medicine Progress Note    [facility]    Ej Valenzuela. Rupali Adams, MARIBEL.A.MAZINOSerinaISerina Porter D.O., BLAYNE Anton, MSN, APRN, NP-C  Shanel Denney. Lou Ling, MSN, APRN-CNP     Primary Care Physician: Rojelio Jordan MD   Admitting Physician:  Param Rose DO  Admission date and time: 8/1/2020 12:29 PM    Room:  [address] [place], [place] [address]  Admitting diagnosis: GI bleed [K92.2]    Patient Name: Krystyna Bailon  MRN: [id number]    Date of Service: 8/4/2020     Subjective:  José Lewis is a 60 y.o. male who was seen and examined today,8/4/2020, at the bedside. José Lewis appeared uncomfortable during my examination today with increasing abdominal distention. He attempted regular food yesterday and states he was somewhat intolerant of this. We discussed his downward trending hemoglobin. He does state that he feels better overall. Review of System:   Constitutional:   Denies fever or chills, weight loss or gain, fatigue or malaise. HEENT:   Denies ear pain, sore throat, sinus or eye problems. Cardiovascular:   Denies any chest pain, irregular heartbeats, or palpitations. Respiratory:   Denies shortness of breath, coughing, sputum production, hemoptysis, or wheezing. Gastrointestinal:   Increased abdominal distention. He has some nausea following the ingestion of regular food yesterday. Genitourinary:    Denies any urgency, frequency, hematuria. Voiding  without difficulty. Extremities:   Denies lower extremity swelling, edema or cyanosis. Neurology:    Denies any headache or focal neurological deficits, Denies generalized weakness or memory difficulty. Psch:   Denies being anxious or depressed. Musculoskeletal:    Prior lower extremity irritation. Denies  myalgias, joint complaints or back pain. Integumentary:   Denies any rashes, ulcers, or excoriations. Denies bruising. Hematologic/Lymphatic:  Denies bruising or bleeding.     Physical Exam:  I/O tamsulosin  0.4 mg Oral Daily     Continuous Infusions:   pantoprozole (PROTONIX) infusion 8 mg/hr (08/03/20 1403)       Objective Data:  CBC with Differential:    Lab Results   Component Value Date    WBC 8.5 08/04/2020    RBC 2.46 08/04/2020    HGB 8.0 08/04/2020    HCT 24.4 08/04/2020     08/04/2020    MCV 99.2 08/04/2020    MCH 32.5 08/04/2020    MCHC 32.8 08/04/2020    RDW 13.9 08/04/2020    LYMPHOPCT 27.0 08/04/2020    MONOPCT 5.7 08/04/2020    BASOPCT 0.6 08/04/2020    MONOSABS 0.49 08/04/2020    LYMPHSABS 2.30 08/04/2020    EOSABS 0.18 08/04/2020    BASOSABS 0.05 08/04/2020     Hemoglobin/Hematocrit:    Lab Results   Component Value Date    HGB 8.0 08/04/2020    HCT 24.4 08/04/2020     BMP:    Lab Results   Component Value Date     08/04/2020    K 3.7 08/04/2020     08/04/2020    CO2 19 08/04/2020    BUN 25 08/04/2020    LABALBU 3.9 08/01/2020    CREATININE 0.7 08/04/2020    CALCIUM 7.9 08/04/2020    GFRAA >60 08/04/2020    LABGLOM >60 08/04/2020    GLUCOSE 93 08/04/2020         Assessment:  1. Peptic ulcer disease precipitated by nonsteroidal anti-inflammatory usage as an outpatient  2. Multiple electrolyte derangements including hypokalemia and hypophosphatemia  3. Essential hypertension  4. COPD without acute exacerbation  5. Chronic diastolic CHF without decompensation, with preserved LVEF    Plan:   Taz Sanchez appears somewhat uncomfortable during my examination today. KUB is indicated stool retention and a more aggressive bowel regimen will be implemented. There is been a downward trend in the patient's hemoglobin and I have discussed this with the GI team.  We will back off to a clear liquid diet today and consider repeat endoscopic intervention tomorrow if hemoglobin were to trend downward once again. Otherwise, chronic comorbidities are well controlled. We will ask the therapy teams to provide consultation as the patient needs to become more ambulatory.     More than 50% of my

## 2020-08-04 NOTE — PROGRESS NOTES
PROGRESS NOTE    By Crescencio Fine D.O GI Fellow    [facility]  Dr. Ivett Castaneda MD, Dr. Lauren Grady MD, Dr Asim Howell, Dr. Tana Motta MD, Dr. Tayler Dos Santos, DO Krissy Perez  60 y.o.  male    SUBJECTIVE: Patient resting company this AM.  Patient does close abdominal distention.   KUB was obtained and reviewed    OBJECTIVE:    /62   Pulse 78   Temp 98 °F (36.7 °C) (Oral)   Resp 18   Wt 170 lb (77.1 kg)   SpO2 98%   BMI 28.29 kg/m²     Gen: NAD, AAO x 3  HEENT:PEERL, no icterus  Heart: RRR, no M/R/G  Lungs: CTAB  Abd.: soft, NT, ND, BS +, no G/R, no HSM  Extr.: no C/C/E, no bruising         Lab Results   Component Value Date    WBC 8.5 08/04/2020    WBC 7.4 08/03/2020    WBC 14.6 08/02/2020    HGB 8.0 08/04/2020    HGB 8.2 08/04/2020    HGB 8.3 08/03/2020    HCT 24.4 08/04/2020    MCV 99.2 08/04/2020    RDW 13.9 08/04/2020     08/04/2020     08/03/2020     08/02/2020     Lab Results   Component Value Date     08/04/2020    K 3.7 08/04/2020     08/04/2020    CO2 19 08/04/2020    BUN 25 08/04/2020    CREATININE 0.7 08/04/2020    CALCIUM 7.9 08/04/2020    PROT 7.2 08/01/2020    LABALBU 3.9 08/01/2020    BILITOT 0.4 08/01/2020    BILITOT 0.3 07/21/2017    ALKPHOS 46 08/01/2020    ALKPHOS 67 07/21/2017    AST 15 08/01/2020    AST 15 07/21/2017    ALT 14 08/01/2020    ALT 19 07/21/2017     Lab Results   Component Value Date    LIPASE 14 08/01/2020     No results found for: AMYLASE      ASSESSMENT/PLAN:      1.  Melena/GI bleed/anemia  -EGD 8/2 revealing-ulcerative esophagitis with 3 distinct ulcers  -Continue PPI for total 72 hours then twice a day for 8 weeks  -Hemoglobin has drifted down to 8.0 this a.m. patient with no overt signs of melena continued GI bleed on exam may be iatrogenic with frequent blood draws  -We will place on clear liquid diet for today and monitor  -P.o. after midnight continues to drop down the patient worse overt signs of GI bleed repeat EGD on 8/5 as patient is currently hemodynamically stable patient with oral intake this a.m.  -Plan for repeat EGD in 6 to 8 weeks to ensure healing   -     2. [address] [address] Screening   -Plan for outpatient colonoscopy     3.    Abnormal CT  -Radiology report states no pneumomediastinum     4. Stool burden on KUB   - Will place on colace 100mg BID and Miralaz 17gm BID     Pt was discussed with Dr. Natalie Soriano DO  GI Fellow   8/4/2020  10:12 AM    Pt seen and independently examined. Pertinent notes and lab work reviewed. Monitor reviewed showing sinus rhythm. D/w Dr. Myron Jimenez. Agree with physical exam and A&P. Increase of H&H to 8.8 without transfusion this afternoon. Re-advance diet  Discontinue PPI drip and place on twice a day IV PPI while in the hospital with plan for twice a day PPI orally on discharge. Discussed with patient - all questions answered - agreeable with the plan as delineated.     Staci Hall MD  8/4/2020  2:37 PM

## 2020-08-04 NOTE — PROGRESS NOTES
Physician Progress Note      PATIENT:               Melanie Dewey  CSN #:                  [id number]  :                       1955  ADMIT DATE:       2020 12:29 PM  [address] [place] DATE:  RESPONDING  PROVIDER #:        Wing Hopkins MD          QUERY TEXT:    Pt admitted with GI bleed/ duodenal ulcer and has Anemia documented. If   possible, please document in progress notes and discharge summary further   specificity regarding the acuity and type of anemia:    The medical record reflects the following:  Risk Factors: Anemia , Duodenal ulcer in setting of  NSAIDS use. Clinical Indicators: H&H 14.5/41.9- 8.5/24.6, intermittent hypotension BP   / 34-69. Iron 161, Iron sat 60. Treatment: IVF 75cc/hr, Protonix drip, EGD with  injection, cautery, banding. Thank you,  Sarai Barrera RN [facility] [phone]  Options provided:  -- Anemia due to acute blood loss  -- Anemia due to chronic blood loss  -- Anemia due to iron deficiency  -- Dilutional anemia  -- Other - I will add my own diagnosis  -- Disagree - Not applicable / Not valid  -- Disagree - Clinically unable to determine / Unknown  -- Refer to Clinical Documentation Reviewer    PROVIDER RESPONSE TEXT:    This patient has acute blood loss anemia.     Query created by: Dolly Garcia on 8/3/2020 10:45 AM      Electronically signed by:  Wing Hopkins MD 2020 4:49 PM

## 2020-08-04 NOTE — PROGRESS NOTES
Physical Therapy Initial Evaluation    Room #:  2125/5045-20  Patient Name: Caryn Ferreira  YOB: 1955  MRN: [id number]    Referring Provider: Galileo Jorge DO    Date of Service: 8/4/2020    Evaluating Physical Therapist:  Mahamed Galindo, PT #[id number]      Diagnosis:   GI bleed [K92.2]        Patient Active Problem List   Diagnosis    Rotator cuff tear    Shoulder impingement    Shoulder pain    Trochanteric bursitis    Hypertension    Depression    Amputation of leg ([address]    Chest pain    Chronic obstructive pulmonary disease with acute exacerbation ([address]    GI bleed        Tentative placement recommendation: Home    Equipment recommendation: Patient has needed equipment       Prior Level of Function: Patient ambulated with crutches    Rehab Potential: good  for baseline    Past medical history:   Past Medical History:   Diagnosis Date    Amputation of leg ([address]     Chest tightness 07/23/2017    lexiscan stress test    Depression     Hypertension      Past Surgical History:   Procedure Laterality Date    ABOVE KNEE AMPUTATION      UPPER GASTROINTESTINAL ENDOSCOPY N/A 8/2/2020    EGD CONTROL HEMORRHAGE performed by Tory Cifuentes DO at [address]  8/2/2020    EGD BIOPSY performed by Tory Cifuentes DO at [address]  8/2/2020    EGD ESOPHAGOGASTRODUODENOSCOPY SCLEROTHERAPY performed by Tory Cifuentes DO at [facility] ENDOSCOPY       Precautions: falls and right  AKA ,      SUBJECTIVE:    Social history: Patient lives with sister  in a two story home bed and bathroom on 2nd floor  with 6 steps  to enter with Rail  Tub shower     Equipment owned: Tub transfer bench and [address] [id number] [facility]   How much difficulty turning over in bed?: None  How much difficulty sitting down on / standing up from a chair with arms?: A Little  How much difficulty moving from lying on back to sitting on side of bed?: A Little  How much help from another person moving to and from a bed to a chair?: A Little  How much help from another person needed to walk in hospital room?: A Little  How much help from another person for climbing 3-5 steps with a railing?: A Little  AM-[facility] Inpatient Mobility Raw Score : 19  AM-PAC Inpatient T-Scale Score : 45.44  Mobility Inpatient CMS 0-100% Score: 41.77  Mobility Inpatient CMS G-Code Modifier : CK    Nursing cleared patient for PT evaluation. The admitting diagnosis and active problem list as listed above have been reviewed prior to the initiation of this evaluation. OBJECTIVE:   Initial Evaluation  Date: 8/4/2020 Treatment Date:     Short Term/ Long Term   Goals   Was pt agreeable to Eval/treatment? Yes    To be met in 2 days   Pain level   Left upper extremitiy due to iv infiltrate, nursing aware  Given ice pack     Bed Mobility    Rolling: Not assessed  patient in bathroom    Rolling: Independent    Supine to sit:  Independent    Sit to supine: Independent    Scooting: Independent     Transfers Sit to stand: Supervision     Sit to stand: Independent     Ambulation    2 x 100 feet using  crutches with Supervision        150 feet using  crutches with Modified Independent    Stair negotiation: ascended and descended   Not assessed       10 steps crutches/rail with supervision    ROM Within functional limits  Right AKA      Strength BUE: 4/5  RLE:  not assessed    LLE:  4/5   Increase strength in affected mm groups by 1/3 grade   Balance Sitting EOB:  good    Dynamic Standing:  fair + crutches  Sitting EOB:  good    Dynamic Standing: good - crutches      Patient is Alert & Oriented x person, place, time and situation and follows directions    Sensation:  Patient  denies numbness and tingling to extremities    Edema:  no    Endurance: fair  +    Vitals:    Heart Rate at rest 96 Heart Rate during session 119   SPO2 at rest 98%  SPO2 during session 96%     Patient education  Patient educated on role of Physical Therapy, risks of immobility, safety and plan of care      Patient response to education:   Pt verbalized understanding Pt demonstrated skill Pt requires further education in this area   Yes Partial Yes      ASSESSMENT: Patient exhibits decreased strength, balance, coordination impairing functional mobility. Therapist educated and facilitated patient on techniques to increase safety and independence with bed mobility, balance, functional transfers, and functional mobility. Treatment:  Patient practiced and was instructed in the following treatment: patient ambulated in hallway, seated rest due to  shortness of breath. Ambulated back to room assisted up to chair with lunch tray. Therapeutic Exercises:  not performed       At end of session, patient in chair with  call light and phone within reach,   all lines and tubes intact, nursing notified. Patient would benefit from continued skilled Physical Therapy to improve functional independence and quality of life. Patient's/ family goals   home        Patient and or family understand(s) diagnosis, prognosis, and plan of care. PLAN:    Physical Therapy care will be provided in accordance with the objectives noted above. Exercises and functional mobility practice will be used as well as appropriate assistive devices or modalities to obtain goals. Patient and family education will also be administered as needed. Frequency of treatments: Patient will be seen    daily  for therapeutic exercise, functional retraining, endurance activities, balance exercises, family and patient education.        Time in  1122  Time out  1140    Total Treatment Time  10 minutes    Evaluation time includes thorough review of current medical information, gathering information on past medical history/social history and prior level of function, completion of standardized testing/informal observation of tasks, assessment of data, and development of Plan of care and goals.      CPT codes:  Low Complexity PT evaluation (55921)  Gait Training (46304) 10 minutes 1 unit(s)    Mayito Dickens, PT

## 2020-08-04 NOTE — PROGRESS NOTES
of motion [facility]  [facility]     Muscle Grade 5/5 5/5    /pinch Strength Intact  Intact     Additional Information: Good  and wfl FMC/dexterity noted during ADL tasks Good  and wfl FMC/dexterity noted during ADL tasks      Functional Assessment:   Initial Eval Status  Date: 8/4/2020 Treatment Status  Date: STGs = LTGs  Time frame: 5-7 days   Feeding Independent   Independent    Grooming Independent   Independent    UB Dressing Independent   Independent    LB Dressing Supervision   Independent    Bathing Supervision  Independent    Toileting Supervision   Independent    Bed Mobility  N/T as pt was on commode upon entering the room and up in bedside chair at end of evaluation. Supine to sit: Independent   Sit to supine: Independent    Functional Transfers Supervision from commode  Supervision for transfer to and from hallway chair  Supervision to stand to sit transfer to bedside chair   Independent    Functional Mobility Supervision with wooden crutches  Modified [place]    Activity Tolerance Fair plus   Good      Balance:   Sitting: good   Standing: fair plus with crutches     Endurance: fair     Comments: Upon arrival to the room the patient was on commode. At end of the session, the patient was up in bedside chair. Call light and phone within reach. All lines and tubes intact. Overall patient demonstrated decreased independence and safety during completion of ADL/functional transfer/mobility tasks. Pt would benefit from continued skilled OT to increase safety and independence with completion of ADL/IADL tasks for functional independence and quality of life.      Treatment: Good sitting balance on commode, supervision for hygiene, Transfer training with verbal cues to for hand placement throughout evaluation to improve safety, static standing balance with crutches to improve balance, functional mobility with crutches to improve balance, verbal cues for hand placement prior to transferring to hallway chair for a seated rest break, O2 saturation remained in the 90's although pt appeared short of breath. Pt assisted back to room, good standing rest break while bedside chair was set up. Ice bag provided for L forearm as IV appears infiltrated. Nursing notified. Set up for lunch. Independent in self feeding. OT services provided to include instruction/training on safety and adapted techniques for completion of transfers and ADL's. Evaluation Complexity:  · Low Complexity  · History: Brief history including review of medical records relating to the problem  · Exam: 1-3 performance Deficits  · Assistance/Modification: No assistance or modifications required to perform tasks. No comorbities affecting occupational performance. Assessment of current deficits   Functional mobility [x]        ADLs [x]        Strength [x]      Cognition []  Functional transfers  [x]       IADLs [x]        Safety Awareness []      Endurance [x]  Fine Motor Coordination []       Balance [x]       Vision/perception []     Sensation []   Gross Motor Coordination []       ROM []         Delirium []                          Motor Control []    Plan of Care: OT 1-3x/week PRN during hospitalization  ADL retraining [x]   Equipment needs [x]   Neuromuscular re-education [] Energy Conservation Techniques [x]  Functional Transfer training [x] Patient and/or Family Education [x]  Functional Mobility training [x]  Environmental Modifications []  Cognitive re-training []   Compensatory techniques for ADLs [x]  Splinting Needs []   Positioning to improve overall function [x]   Therapeutic Activity [x]  Therapeutic Exercise  []  Visual/Perceptual: []    Delirium prevention/treatment  []  Other:  []    Rehab Potential: Good for established goals developed with patient and family. Patient / Family Goal: return home      Patient and/or family were instructed on functional diagnosis, prognosis/goals and OT plan of care.  Demonstrated fair understanding. Time In: 11:23 am   Time Out: 11:40am                 Treatment Charges: Mins Units   ADL/Home Mgt                    68938 2    Therapeutic Activities          [id number]    Therapeutic Exercise          93310     Manual Therapy                  57117     Neuro Re-ed                       15138     Orthotic manage/training    99380     Total Timed Treatment 10 1     Evaluation time includes thorough review of current medical information, gathering information on past medical history/social history and prior level of function, completion of standardized testing/informal observation of tasks, assessment of data, and development of POC/Goals.     Wilmer Henson OTR/L [id number]

## 2020-08-05 VITALS
BODY MASS INDEX: 29.34 KG/M2 | WEIGHT: 176.3 LBS | RESPIRATION RATE: 18 BRPM | SYSTOLIC BLOOD PRESSURE: 145 MMHG | DIASTOLIC BLOOD PRESSURE: 73 MMHG | OXYGEN SATURATION: 96 % | HEART RATE: 72 BPM | TEMPERATURE: 96.5 F

## 2020-08-05 LAB
ANION GAP SERPL CALCULATED.3IONS-SCNC: 12 MMOL/L (ref 7–16)
BASOPHILS ABSOLUTE: 0.05 E9/L (ref 0–0.2)
BASOPHILS RELATIVE PERCENT: 0.6 % (ref 0–2)
BUN BLDV-MCNC: 16 MG/DL (ref 8–23)
CALCIUM SERPL-MCNC: 8.3 MG/DL (ref 8.6–10.2)
CHLORIDE BLD-SCNC: 106 MMOL/L (ref 98–107)
CO2: 20 MMOL/L (ref 22–29)
CREAT SERPL-MCNC: 0.7 MG/DL (ref 0.7–1.2)
EOSINOPHILS ABSOLUTE: 0.17 E9/L (ref 0.05–0.5)
EOSINOPHILS RELATIVE PERCENT: 2.2 % (ref 0–6)
GFR AFRICAN AMERICAN: >60
GFR NON-AFRICAN AMERICAN: >60 ML/MIN/1.73
GLUCOSE BLD-MCNC: 88 MG/DL (ref 74–99)
HCT VFR BLD CALC: 24.5 % (ref 37–54)
HCT VFR BLD CALC: 24.5 % (ref 37–54)
HCT VFR BLD CALC: 26.4 % (ref 37–54)
HEMOGLOBIN: 8.2 G/DL (ref 12.5–16.5)
HEMOGLOBIN: 8.4 G/DL (ref 12.5–16.5)
HEMOGLOBIN: 9.1 G/DL (ref 12.5–16.5)
IMMATURE GRANULOCYTES #: 0.03 E9/L
IMMATURE GRANULOCYTES %: 0.4 % (ref 0–5)
LYMPHOCYTES ABSOLUTE: 1.87 E9/L (ref 1.5–4)
LYMPHOCYTES RELATIVE PERCENT: 23.8 % (ref 20–42)
MAGNESIUM: 1.7 MG/DL (ref 1.6–2.6)
MCH RBC QN AUTO: 33.2 PG (ref 26–35)
MCHC RBC AUTO-ENTMCNC: 34.3 % (ref 32–34.5)
MCV RBC AUTO: 96.8 FL (ref 80–99.9)
MONOCYTES ABSOLUTE: 0.43 E9/L (ref 0.1–0.95)
MONOCYTES RELATIVE PERCENT: 5.5 % (ref 2–12)
NEUTROPHILS ABSOLUTE: 5.32 E9/L (ref 1.8–7.3)
NEUTROPHILS RELATIVE PERCENT: 67.5 % (ref 43–80)
PDW BLD-RTO: 14 FL (ref 11.5–15)
PHOSPHORUS: 2.9 MG/DL (ref 2.5–4.5)
PLATELET # BLD: 150 E9/L (ref 130–450)
PMV BLD AUTO: 12.3 FL (ref 7–12)
POTASSIUM SERPL-SCNC: 3.8 MMOL/L (ref 3.5–5)
RBC # BLD: 2.53 E12/L (ref 3.8–5.8)
SODIUM BLD-SCNC: 138 MMOL/L (ref 132–146)
WBC # BLD: 7.9 E9/L (ref 4.5–11.5)

## 2020-08-05 PROCEDURE — 83735 ASSAY OF MAGNESIUM: CPT

## 2020-08-05 PROCEDURE — 6360000002 HC RX W HCPCS: Performed by: HOSPITALIST

## 2020-08-05 PROCEDURE — 84100 ASSAY OF PHOSPHORUS: CPT

## 2020-08-05 PROCEDURE — 85018 HEMOGLOBIN: CPT

## 2020-08-05 PROCEDURE — 6370000000 HC RX 637 (ALT 250 FOR IP): Performed by: INTERNAL MEDICINE

## 2020-08-05 PROCEDURE — C9113 INJ PANTOPRAZOLE SODIUM, VIA: HCPCS | Performed by: HOSPITALIST

## 2020-08-05 PROCEDURE — 36415 COLL VENOUS BLD VENIPUNCTURE: CPT

## 2020-08-05 PROCEDURE — 2580000003 HC RX 258: Performed by: HOSPITALIST

## 2020-08-05 PROCEDURE — 85014 HEMATOCRIT: CPT

## 2020-08-05 PROCEDURE — 94640 AIRWAY INHALATION TREATMENT: CPT

## 2020-08-05 PROCEDURE — 6370000000 HC RX 637 (ALT 250 FOR IP): Performed by: NURSE PRACTITIONER

## 2020-08-05 PROCEDURE — 85025 COMPLETE CBC W/AUTO DIFF WBC: CPT

## 2020-08-05 PROCEDURE — 6360000002 HC RX W HCPCS: Performed by: NURSE PRACTITIONER

## 2020-08-05 PROCEDURE — 2580000003 HC RX 258: Performed by: INTERNAL MEDICINE

## 2020-08-05 PROCEDURE — 6370000000 HC RX 637 (ALT 250 FOR IP): Performed by: STUDENT IN AN ORGANIZED HEALTH CARE EDUCATION/TRAINING PROGRAM

## 2020-08-05 PROCEDURE — 80048 BASIC METABOLIC PNL TOTAL CA: CPT

## 2020-08-05 PROCEDURE — 97530 THERAPEUTIC ACTIVITIES: CPT

## 2020-08-05 RX ORDER — TAMSULOSIN HYDROCHLORIDE 0.4 MG/1
0.4 CAPSULE ORAL DAILY
Qty: 30 CAPSULE | Refills: 0 | Status: SHIPPED | OUTPATIENT
Start: 2020-08-05

## 2020-08-05 RX ORDER — PSEUDOEPHEDRINE HCL 30 MG
100 TABLET ORAL 2 TIMES DAILY
Qty: 60 CAPSULE | Refills: 0 | Status: SHIPPED | OUTPATIENT
Start: 2020-08-05 | End: 2020-09-04

## 2020-08-05 RX ORDER — ASPIRIN 81 MG/1
81 TABLET, CHEWABLE ORAL DAILY
Qty: 30 TABLET | Refills: 3 | COMMUNITY
Start: 2020-08-05

## 2020-08-05 RX ORDER — PANTOPRAZOLE SODIUM 40 MG/1
40 TABLET, DELAYED RELEASE ORAL
Qty: 120 TABLET | Refills: 0 | Status: SHIPPED | OUTPATIENT
Start: 2020-08-05 | End: 2020-10-04

## 2020-08-05 RX ORDER — POLYETHYLENE GLYCOL 3350 17 G/17G
17 POWDER, FOR SOLUTION ORAL DAILY PRN
Qty: 527 G | Refills: 0 | Status: SHIPPED | OUTPATIENT
Start: 2020-08-05 | End: 2020-09-04

## 2020-08-05 RX ADMIN — PANTOPRAZOLE SODIUM 40 MG: 40 INJECTION, POWDER, FOR SOLUTION INTRAVENOUS at 08:33

## 2020-08-05 RX ADMIN — SODIUM CHLORIDE 10 ML: 9 INJECTION INTRAMUSCULAR; INTRAVENOUS; SUBCUTANEOUS at 08:33

## 2020-08-05 RX ADMIN — ARFORMOTEROL TARTRATE 15 MCG: 15 SOLUTION RESPIRATORY (INHALATION) at 06:41

## 2020-08-05 RX ADMIN — SODIUM CHLORIDE, PRESERVATIVE FREE 10 ML: 5 INJECTION INTRAVENOUS at 08:39

## 2020-08-05 RX ADMIN — BUDESONIDE 500 MCG: 0.5 INHALANT RESPIRATORY (INHALATION) at 17:10

## 2020-08-05 RX ADMIN — ACETAMINOPHEN 650 MG: 325 TABLET, FILM COATED ORAL at 08:38

## 2020-08-05 RX ADMIN — SODIUM CHLORIDE, PRESERVATIVE FREE 10 ML: 5 INJECTION INTRAVENOUS at 00:09

## 2020-08-05 RX ADMIN — BUDESONIDE 500 MCG: 0.5 INHALANT RESPIRATORY (INHALATION) at 06:41

## 2020-08-05 RX ADMIN — ARFORMOTEROL TARTRATE 15 MCG: 15 SOLUTION RESPIRATORY (INHALATION) at 17:10

## 2020-08-05 RX ADMIN — DOCUSATE SODIUM 100 MG: 100 CAPSULE, LIQUID FILLED ORAL at 08:34

## 2020-08-05 RX ADMIN — TAMSULOSIN HYDROCHLORIDE 0.4 MG: 0.4 CAPSULE ORAL at 08:34

## 2020-08-05 ASSESSMENT — PAIN DESCRIPTION - PAIN TYPE: TYPE: CHRONIC PAIN

## 2020-08-05 ASSESSMENT — PAIN SCALES - GENERAL
PAINLEVEL_OUTOF10: 7
PAINLEVEL_OUTOF10: 2
PAINLEVEL_OUTOF10: 3

## 2020-08-05 NOTE — PLAN OF CARE
Problem: Falls - Risk of:  Goal: Will remain free from falls  Description: Will remain free from falls  8/5/2020 0512 by Kolton Parker RN  Outcome: Met This Shift     Problem: Falls - Risk of:  Goal: Absence of physical injury  Description: Absence of physical injury  8/5/2020 0512 by Kolton Parker RN  Outcome: Met This Shift     Problem: Pain:  Goal: Pain level will decrease  Description: Pain level will decrease  Outcome: Met This Shift     Problem: Pain:  Goal: Control of acute pain  Description: Control of acute pain  Outcome: Met This Shift     Problem: Pain:  Goal: Control of chronic pain  Description: Control of chronic pain  Outcome: Met This Shift

## 2020-08-05 NOTE — PLAN OF CARE
Problem: Pain:  Goal: Pain level will decrease  8/5/2020 1526 by Annalise Kim RN  Outcome: Met This Shift  8/5/2020 0512 by Dudley Maynard RN  Outcome: Met This Shift  Goal: Control of acute pain  8/5/2020 1526 by Annalise Kim RN  Outcome: Met This Shift  8/5/2020 0512 by Dudley Maynard RN  Outcome: Met This Shift  Goal: Control of chronic pain  8/5/2020 1526 by Annalise Kim RN  Outcome: Met This Shift  8/5/2020 0512 by Dudley Maynard RN  Outcome: Met This Shift

## 2020-08-05 NOTE — PROGRESS NOTES
Physical Therapy Treatment Note    Room #:  3972/7126-27  Patient Name: Michael Kaur  YOB: 1955  MRN: [id number]    Referring Provider: Ortega Krishnamurthy DO    Date of Service: 8/5/2020    Evaluating Physical Therapist:  Rip Corrigan, PT #[id number]      Diagnosis:   GI bleed [K92.2]        Patient Active Problem List   Diagnosis    Rotator cuff tear    Shoulder impingement    Shoulder pain    Trochanteric bursitis    Hypertension    Depression    Amputation of leg ([address]    Chest pain    Chronic obstructive pulmonary disease with acute exacerbation ([address]    GI bleed        Tentative placement recommendation: Home    Equipment recommendation: Patient has needed equipment       Prior Level of Function: Patient ambulated with crutches    Rehab Potential: good  for baseline    Past medical history:   Past Medical History:   Diagnosis Date    Amputation of leg ([address]     Chest tightness 07/23/2017    lexiscan stress test    Depression     Hypertension      Past Surgical History:   Procedure Laterality Date    ABOVE KNEE AMPUTATION      UPPER GASTROINTESTINAL ENDOSCOPY N/A 8/2/2020    EGD CONTROL HEMORRHAGE performed by Sadie Perales DO at [facility]  8/2/2020    EGD BIOPSY performed by Sadie Perales DO at [facility]  8/2/2020    EGD ESOPHAGOGASTRODUODENOSCOPY SCLEROTHERAPY performed by Sadie Perales DO at [facility] ENDOSCOPY       Precautions: falls and right  AKA ,      SUBJECTIVE:    Social history: Patient lives with sister  in a two story home bed and bathroom on 2nd floor  with 6 steps  to enter with Rail  Tub shower     Equipment owned: Tub transfer bench and [address]       [address] [facility]   How much difficulty turning over in bed?: None  How much difficulty sitting down on / standing up from a chair with arms?: A Little  How much difficulty moving from lying on back to sitting on side of bed?: None  How much help from another person moving to and from a bed to a chair?: A Little  How much help from another person needed to walk in hospital room?: A Little  How much help from another person for climbing 3-5 steps with a railing?: A Little  AM-PAC Inpatient Mobility Raw Score : 20  AM-PAC Inpatient T-Scale Score : 47.67  Mobility Inpatient CMS 0-100% Score: 35.83  Mobility Inpatient CMS G-Code Modifier : [address] cleared patient for PT treatment. OBJECTIVE:   Initial Evaluation  Date: 8/4/2020 Treatment Date:  8/5/2020       Short Term/ Long Term   Goals   Was pt agreeable to Eval/treatment? Yes   yes To be met in 2 days   Pain level   Left upper extremitiy due to iv infiltrate, nursing aware  Given ice pack 0/10    Bed Mobility    Rolling: Not assessed  patient in bathroom   Rolling: Supervision    Supine to sit: Supervision    Sit to supine: Supervision    Scooting: Supervision     Rolling: Independent    Supine to sit: Independent    Sit to supine: Independent    Scooting: Independent     Transfers Sit to stand: Supervision    Sit to stand: Supervision       Sit to stand: Independent     Ambulation    2 x 100 feet using  crutches with Supervision      100 x 2 feet using  crutches with Supervision  V/C for pacing and safety.     150 feet using  crutches with Modified Independent    Stair negotiation: ascended and descended   Not assessed       10 steps crutches/rail with supervision    ROM Within functional limits  Right AKA      Strength BUE: 4/5  RLE:  not assessed    LLE:  4/5   Increase strength in affected mm groups by 1/3 grade   Balance Sitting EOB:  good    Dynamic Standing:  fair + crutches Sitting EOB:  good   Dynamic Standing:  fair    Sitting EOB:  good    Dynamic Standing: good - crutches      Patient is Alert & Oriented x person, place, time and situation  and follows directions    Sensation:  Pt denies numbness and tingling to extremities  Edema:  no Patient education  Patient educated on role of Physical Therapy, risks of immobility, safety and plan of care, energy conservation and safety      Patient response to education:   Pt verbalized understanding Pt demonstrated skill Pt requires further education in this area   Yes Partial Yes       ASSESSMENT:    Comments:  Pt able to perform gait activities with no LOB or unsteadiness noted. Treatment:  Patient practiced and was instructed in the following treatment:    Pt transferred to edge of bed. Sat edge of bed 2 minutes with Supervision  to increase dynamic sitting balance and activity tolerance. Pt stood, ambulated in the hallway, needed a seated rest period in the waiting room, and ambulated back to the bed. Therapeutic Exercises: not performed      At end of session, patient in bed with  call light and phone within reach,  all lines and tubes intact, nursing notified. Patient would benefit from continued skilled Physical Therapy to improve functional independence and quality of life. PLAN:    Patient is making good progress towards established goals. Will continue with current Plan of care. Time in  2:33  Time out  2:43    Total Treatment Time  10 minutes    CPT codes:  Therapeutic activities (12552)   10 minutes  1 unit(s)    Heike Tyler  [facility]  LIC # [id number]

## 2020-08-05 NOTE — PLAN OF CARE
Okay to be discharged from GI POV with follow-up in the office in 2 to 3 weeks -patient to call for appointment and with questions/concerns at [phone]. Continue PPI twice a day on discharge. Thank you for the opportunity to participate in the care of Mr. Milton Teixeira.     Tad Ross MD  8/5/2020  12:29 PM

## 2020-08-05 NOTE — DISCHARGE SUMMARY
Internal Medicine Progress Note     [facility]     Marissaelaina Swanson. Paulie Menjivar., BRIEN.MAZINOSerinaI. Dottie Avalos D.O., ARYAOMATTEO Bowman D.O. Sami Cheatham, MSN, APRN, NP-C  Rose Scruggsor. Elier Brown, MSN, APRN-CNP       Internal Medicine  Discharge Summary    NAME: Haroldo Cunningham  :  1955  MRN:  [id number]  Eli Oseguera MD  ADMITTED: 2020      DISCHARGED: 20    ADMITTING PHYSICIAN: Helena Cervantes DO    CONSULTANT(S):   IP CONSULT TO GI  IP CONSULT TO UROLOGY  IP CONSULT TO ANESTHESIOLOGY     ADMITTING DIAGNOSIS:   GI bleed [K92.2]     DISCHARGE DIAGNOSES:   1. Peptic ulcer disease precipitated by nonsteroidal anti-inflammatory usage as an outpatient  2. Multiple electrolyte derangements including hypokalemia and hypophosphatemia  3. Essential hypertension  4. COPD without acute exacerbation  5. Chronic diastolic CHF without decompensation, with preserved LVEF    BRIEF HISTORY OF PRESENT ILLNESS:   Gillian Mathias is a 41-year-old  gentleman who presented to [address] [place] [address] emergency department today after having multiple bouts of melena and near syncope last evening. He states he awoke from sleep with the urgent need to use the restroom. When he got up to use the restroom he felt as though he may pass out. States that he had return of these symptoms of near syncope while at rest after returning from the bathroom as well as bouts of diaphoresis and lightheadedness. He admits to dark black liquid stools. He denies any known anticoagulant use. He does take a baby aspirin but no other antiplatelet agents. States he has been taking ibuprofen 2-3 times daily as per prescription of his doctor. His medication list is reviewed, he has no longer taking Toradol. He denies any known history of liver disease or esophageal varices. Does not recall any recent EGD or colonoscopy and does not follow with gastroenterologist locally.   He is not a regular drinker of alcohol, stating he has been abstinent from alcohol for the last 30 years or so. He denies any tanya blood in the stools or any other history of gastrointestinal bleeding in the past, ulcerations or known GI malignancy. LABS[de-identified]  Lab Results   Component Value Date    WBC 7.9 08/05/2020    HGB 8.4 (L) 08/05/2020    HCT 24.5 (L) 08/05/2020     08/05/2020     08/05/2020    K 3.8 08/05/2020     08/05/2020    CREATININE 0.7 08/05/2020    BUN 16 08/05/2020    CO2 20 (L) 08/05/2020    GLUCOSE 88 08/05/2020    ALT 14 08/01/2020    AST 15 08/01/2020    INR 1.0 08/01/2020     Lab Results   Component Value Date    INR 1.0 08/01/2020    INR 1.1 08/01/2020    PROTIME 12.0 08/01/2020    PROTIME 12.4 08/01/2020      No results found for: TSH  Lab Results   Component Value Date    TRIG 178 (H) 07/22/2017     Lab Results   Component Value Date    HDL 28 07/22/2017     Lab Results   Component Value Date    LDLCALC 56 07/22/2017     Lab Results   Component Value Date    LABA1C 5.6 07/22/2017       IMAGING:  Xr Chest (2 Vw)    Result Date: 8/1/2020  EXAMINATION: TWO XRAY VIEWS OF THE CHEST 8/1/2020 2:36 pm COMPARISON: July 21, 2017 HISTORY: ORDERING SYSTEM PROVIDED HISTORY: SOB TECHNOLOGIST PROVIDED HISTORY: Reason for exam:->SOB FINDINGS: The cardiomediastinal silhouette is within normal range. There is hyperinflation of the lungs, which are otherwise clear. There is no focal pulmonary consolidation, pleural effusion, pneumothorax, or evidence of airspace pulmonary edema. No acute cardiopulmonary process. Xr Abdomen (kub) (single Ap View)    Result Date: 8/4/2020  EXAMINATION: ONE SUPINE XRAY VIEW(S) OF THE ABDOMEN 8/4/2020 9:19 am COMPARISON: None. Correlated with CT abdomen pelvis from August 1, 2020.  HISTORY: ORDERING SYSTEM PROVIDED HISTORY: portable, abd distention TECHNOLOGIST PROVIDED HISTORY: Reason for exam:->portable, abd distention FINDINGS: There is relative paucity of small bowel gas, a nonspecific finding that can be seen with dilated and fluid-filled bowel or decompressed bowel. A small to moderate amount of stool is present throughout the colon. No convincing evidence of nephroureterolithiasis. Multiple metallic fragments along with surgical clips overlie the right lower quadrant and proximal right lower extremity laterally. Numerous additional midline pelvic surgical clips are present. No acute osseous abnormality. Relative paucity of small bowel gas, a nonspecific finding that can be seen with dilated and fluid-filled bowel or decompressed bowel. Small to moderate amount of stool throughout the colon. Ct Head Wo Contrast    Result Date: 8/1/2020  EXAMINATION: CT OF THE HEAD WITHOUT CONTRAST  8/1/2020 3:16 pm TECHNIQUE: CT of the head was performed without the administration of intravenous contrast. Dose modulation, iterative reconstruction, and/or weight based adjustment of the mA/kV was utilized to reduce the radiation dose to as low as reasonably achievable. COMPARISON: None. HISTORY: ORDERING SYSTEM PROVIDED HISTORY: fall, syncope TECHNOLOGIST PROVIDED HISTORY: Has a \"code stroke\" or \"stroke alert\" been called? ->No Reason for exam:->fall, syncope FINDINGS: BRAIN/VENTRICLES: There is no acute intracranial hemorrhage, mass effect or midline shift. No abnormal extra-axial fluid collection. The gray-white differentiation is maintained without evidence of an acute infarct. There is no evidence of hydrocephalus. ORBITS: The visualized portion of the orbits demonstrate no acute abnormality. SINUSES: The visualized paranasal sinuses and mastoid air cells demonstrate no acute abnormality. SOFT TISSUES/SKULL:  No acute abnormality of the visualized skull or soft tissues. No acute intracranial abnormality.      Ct Chest W Contrast    Result Date: 8/2/2020  EXAMINATION: CT OF THE CHEST WITH CONTRAST 8/2/2020 10:36 am TECHNIQUE: CT of the chest was performed with the administration of intravenous contrast. Multiplanar reformatted images are provided for review. Dose modulation, iterative reconstruction, and/or weight based adjustment of the mA/kV was utilized to reduce the radiation dose to as low as reasonably achievable. COMPARISON: None. Correlated with cervical spine CT from yesterday. HISTORY: ORDERING SYSTEM PROVIDED HISTORY: sob, r/o pneumomedistinum from CT Neck suggestion TECHNOLOGIST PROVIDED HISTORY: Reason for exam:->sob, r/o pneumomedistinum from CT Neck suggestion FINDINGS: Mediastinum: The heart is not enlarged. Coronary artery calcifications are present, potentially a marker for coronary artery disease. Moderate-sized hiatal hernia is present. The thoracic esophagus is decompressed. There appears to be esophageal wall thickening, nonspecific finding can be seen with esophagitis. Visible but nonenlarged mediastinal lymph nodes are present. Moderate atherosclerotic plaques are noted in the thoracic aorta. Again noted is a tiny locule of gas adjacent to the mid trachea and adjacent to the esophagus on axial image 18. There appears to be a thin peripheral rim of soft tissue surrounding the locule of gas. I suspect this reflects a small tracheal diverticulum given its position in relation to the trachea. There is no evidence of pneumomediastinum. Lungs/pleura: The central airways are patent. There are moderate emphysematous changes, both centrilobular and paraseptal, throughout both lungs. No evidence of a pleural effusion or pneumothorax. No suspicious pulmonary nodule. Upper Abdomen: Imaged portions the upper abdomen reveal a subcentimeter low-density lesion arising from the mid upper aspect of the right kidney, likely a cyst.  No acute abnormality. Soft Tissues/Bones: There is no acute osseous abnormality. Subcentimeter locule of contained gas in the upper mediastinum, immediately adjacent to the mid trachea.   This likely reflects a tracheal diverticulum, given the thin, peripheral soft tissue rim. No evidence of pneumomediastinum. Moderate emphysematous changes throughout both lungs. Moderate-sized hiatal hernia with diffuse esophageal wall thickening, a nonspecific finding that may be on the basis of esophagitis. Additional findings as above. Ct Cervical Spine Wo Contrast    Result Date: 8/1/2020  EXAMINATION: CT OF THE CERVICAL SPINE WITHOUT CONTRAST 8/1/2020 3:16 pm TECHNIQUE: CT of the cervical spine was performed without the administration of intravenous contrast. Multiplanar reformatted images are provided for review. Dose modulation, iterative reconstruction, and/or weight based adjustment of the mA/kV was utilized to reduce the radiation dose to as low as reasonably achievable. COMPARISON: None. HISTORY: ORDERING SYSTEM PROVIDED HISTORY: pain, fall with syncope TECHNOLOGIST PROVIDED HISTORY: Reason for exam:->pain, fall with syncope FINDINGS: BONES/ALIGNMENT: There is no acute fracture or traumatic malalignment. DEGENERATIVE CHANGES: There are moderate to severe degenerative changes of the cervical spine, most pronounced at C4-C5, C5-C6, C6-C7 and C7-T1. These mainly involve significant intervertebral disc space narrowing. Mild-to-moderate facet arthropathy is present on the right at these levels. SOFT TISSUES: There is no prevertebral soft tissue swelling. A tiny locule of gas appears to be adjacent to the trachea and esophagus within the upper chest on axial image 150. This may be within a vein. Pneumomediastinum not completely excluded. No evidence of acute fracture or traumatic malalignment of the cervical spine. There are moderate to advanced degenerative changes involving the cervical spine as described. Tiny locule of gas adjacent to the trachea and esophagus within the upper chest.  This may be within a small vein. Pneumomediastinum not excluded. Recommend correlation with CT of the chest for further assessment.      Ct Abdomen Pelvis W Iv Contrast Additional Contrast? None    Result Date: 8/1/2020  EXAMINATION: CT OF THE ABDOMEN AND PELVIS WITH CONTRAST 8/1/2020 3:16 pm TECHNIQUE: CT of the abdomen and pelvis was performed with the administration of intravenous contrast. Multiplanar reformatted images are provided for review. Dose modulation, iterative reconstruction, and/or weight based adjustment of the mA/kV was utilized to reduce the radiation dose to as low as reasonably achievable. COMPARISON: July 21, 2017. HISTORY: ORDERING SYSTEM PROVIDED HISTORY: fall, abdominal LLQ pain/bruising, GI bleed TECHNOLOGIST PROVIDED HISTORY: Additional Contrast?->None Reason for exam:->fall, abdominal LLQ pain/bruising, GI bleed FINDINGS: Lower Chest: The lung bases demonstrate moderate emphysematous changes. Heart size is unable to be accurately assessed. Coronary artery calcifications are present, potentially a marker for coronary artery disease. A small hiatal hernia is present. Organs: The gallbladder is not dilated. The liver enhances homogeneously. The spleen and adrenal glands are normal in size. The pancreas enhances homogeneously. There are scattered subcentimeter cortically based renal lesions noted bilaterally. The majority these are too small to accurately characterize but likely reflects cysts. No hydronephrosis. GI/Bowel: No evidence of a bowel obstruction, free air or pneumatosis. Portions the colon are decompressed, limiting assessment for mucosal based abnormalities. There is diverticulosis without evidence of diverticulitis. The appendix is not confidently visualized. Surgical clips adjacent to the cecum suggest prior appendectomy. Pelvis: The urinary bladder is distended with circumferential wall thickening. This may be on the basis of chronic outlet obstruction due to the enlarged prostate gland which measures 5.4 cm in transverse diameter. Scattered visible but nonenlarged pelvic lymph nodes are present.   No free fluid is identified times daily             Fluticasone Furoate-Vilanterol (BREO ELLIPTA IN)  Inhale 1 puff into the lungs daily Pt is unsure of dose             pantoprazole (PROTONIX) 40 MG tablet  Take 1 tablet by mouth 2 times daily (before meals)             polyethylene glycol (GLYCOLAX) 17 g packet  Take 17 g by mouth daily as needed for Constipation             tamsulosin (FLOMAX) 0.4 MG capsule  Take 1 capsule by mouth daily                 FOLLOW UP/INSTRUCTIONS:  · This patient is instructed to follow-up with his primary care physician. · Patient is instructed to follow-up with the consults listed above as directed by them. · he is instructed to resume home medications and take new medications as indicated in the list above. · If the patient has a recurrence of symptoms, he is instructed to go to the ED. Preparing for this patient's discharge, including paperwork, orders, instructions, and meeting with patient did require > 40 minutes.     Gretchen Trejo DO   8/5/2020  8:58 AM

## 2021-05-24 DIAGNOSIS — M25.552 LEFT HIP PAIN: Primary | ICD-10-CM

## 2021-05-25 ENCOUNTER — OFFICE VISIT (OUTPATIENT)
Dept: ORTHOPEDIC SURGERY | Age: 66
End: 2021-05-25
Payer: MEDICARE

## 2021-05-25 VITALS — TEMPERATURE: 98.5 F | HEIGHT: 66 IN | WEIGHT: 168 LBS | BODY MASS INDEX: 27 KG/M2

## 2021-05-25 DIAGNOSIS — M70.62 TROCHANTERIC BURSITIS OF LEFT HIP: Primary | ICD-10-CM

## 2021-05-25 PROCEDURE — 20610 DRAIN/INJ JOINT/BURSA W/O US: CPT | Performed by: ORTHOPAEDIC SURGERY

## 2021-05-25 PROCEDURE — 4004F PT TOBACCO SCREEN RCVD TLK: CPT | Performed by: ORTHOPAEDIC SURGERY

## 2021-05-25 PROCEDURE — 1123F ACP DISCUSS/DSCN MKR DOCD: CPT | Performed by: ORTHOPAEDIC SURGERY

## 2021-05-25 PROCEDURE — 3017F COLORECTAL CA SCREEN DOC REV: CPT | Performed by: ORTHOPAEDIC SURGERY

## 2021-05-25 PROCEDURE — G8427 DOCREV CUR MEDS BY ELIG CLIN: HCPCS | Performed by: ORTHOPAEDIC SURGERY

## 2021-05-25 PROCEDURE — 4040F PNEUMOC VAC/ADMIN/RCVD: CPT | Performed by: ORTHOPAEDIC SURGERY

## 2021-05-25 PROCEDURE — 99203 OFFICE O/P NEW LOW 30 MIN: CPT | Performed by: ORTHOPAEDIC SURGERY

## 2021-05-25 PROCEDURE — G8417 CALC BMI ABV UP PARAM F/U: HCPCS | Performed by: ORTHOPAEDIC SURGERY

## 2021-05-25 RX ORDER — TRIAMCINOLONE ACETONIDE 40 MG/ML
40 INJECTION, SUSPENSION INTRA-ARTICULAR; INTRAMUSCULAR ONCE
Status: COMPLETED | OUTPATIENT
Start: 2021-05-25 | End: 2021-05-25

## 2021-05-25 RX ORDER — HYDROCODONE BITARTRATE AND ACETAMINOPHEN 5; 325 MG/1; MG/1
1 TABLET ORAL EVERY 6 HOURS PRN
Qty: 10 TABLET | Refills: 0 | Status: SHIPPED
Start: 2021-05-25 | End: 2021-08-24 | Stop reason: SDUPTHER

## 2021-05-25 RX ADMIN — TRIAMCINOLONE ACETONIDE 40 MG: 40 INJECTION, SUSPENSION INTRA-ARTICULAR; INTRAMUSCULAR at 14:49

## 2021-05-25 NOTE — PROGRESS NOTES
Chief Complaint   Patient presents with    Hip Pain     Left hip pain x 2 months. Patient states he was taking Ibuprofen but had to stop due to ulcers. Virginia Espinosa  Is a 73 y.o. male  is following up today with left hip pain. Patient states pain level is a 10/10. He has tried none. Past Medical History:   Diagnosis Date    Amputation of leg ([address]     Chest tightness 07/23/2017    lexiscan stress test    Depression     Hypertension      Past Surgical History:   Procedure Laterality Date    ABOVE KNEE AMPUTATION      UPPER GASTROINTESTINAL ENDOSCOPY N/A 8/2/2020    EGD CONTROL HEMORRHAGE performed by Bryanna Perez DO at [address] [place] [address]  8/2/2020    EGD BIOPSY performed by Bryanna Perez DO at [address] [place] [address]  8/2/2020    EGD ESOPHAGOGASTRODUODENOSCOPY SCLEROTHERAPY performed by Bryanna Perez DO at [place] [address]       Current Outpatient Medications:     HYDROcodone-acetaminophen (NORCO) 5-325 MG per tablet, Take 1 tablet by mouth every 6 hours as needed for Pain for up to 7 days. , Disp: 10 tablet, Rfl: 0    aspirin 81 MG chewable tablet, Take 1 tablet by mouth daily, Disp: 30 tablet, Rfl: 3    tamsulosin (FLOMAX) 0.4 MG capsule, Take 1 capsule by mouth daily, Disp: 30 capsule, Rfl: 0    pantoprazole (PROTONIX) 40 MG tablet, Take 1 tablet by mouth 2 times daily (before meals), Disp: 120 tablet, Rfl: 0    Fluticasone Furoate-Vilanterol (BREO ELLIPTA IN), Inhale 1 puff into the lungs daily Pt is unsure of dose, Disp: , Rfl:   Allergies   Allergen Reactions    Codeine Itching    Clindamycin/Lincomycin Rash     Social History     Socioeconomic History    Marital status:      Spouse name: Not on file    Number of children: Not on file    Years of education: Not on file    Highest education level: Not on file   Occupational History    Not on file   Tobacco Use    Smoking status: Current Every Day Smoker Types: Cigars    Tobacco comment: cigars   Substance and Sexual Activity    Alcohol use: Not on file    Drug use: Yes     Types: Marijuana    Sexual activity: Not on file   Other Topics Concern    Not on file   Social History Narrative    Not on file     Social Determinants of Health     Financial Resource Strain:     Difficulty of Paying Living Expenses:    Food Insecurity:     Worried About Running Out of Food in the Last Year:     [address] in the Last Year:    Transportation Needs:     Lack of Transportation (Medical):  Lack of Transportation (Non-Medical):    Physical Activity:     Days of Exercise per Week:     Minutes of Exercise per Session:    Stress:     Feeling of Stress :    Social Connections:     Frequency of Communication with Friends and Family:     Frequency of Social Gatherings with Friends and Family:     Attends Congregation Services:     Active Member of Clubs or Organizations:     Attends Club or Organization Meetings:     Marital Status:    Intimate Partner Violence:     Fear of Current or Ex-Partner:     Emotionally Abused:     Physically Abused:     Sexually Abused:      History reviewed. No pertinent family history. REVIEW OF SYSTEMS:     General/Constitution:  (-)weight loss, (-)fever, (-)chills, (-)weakness. Skin: (-) rash,(-) psoriasis,(-) eczema, (-)skin cancer. Musculoskeletal: (-) fractures,  (-) dislocations,(-) collagen vascular disease, (-) fibromyalgia, (-) multiple sclerosis, (-) muscular dystrophy, (-) RSD,(-) joint pain (-)swelling, (-) joint pain,swelling. Neurologic: (-) epilepsy, (-)seizures,(-) brain tumor,(-) TIA, (-)stroke, (-)headaches, (-)Parkinson disease,(-) memory loss, (-) LOC. Cardiovascular: (-) Chest pain, (-) swelling in legs/feet, (-) SOB, (-) cramping in legs/feet with walking. Respiratory: (-) SOB, (-) Coughing, (-) night sweats.   GI: (-) nausea, (-) vomiting, (-) diarrhea, (-) blood in stool, (-) gastric ulcer.  Psychiatric: (-) Depression, (-) Anxiety, (-) bipolar disease, (-) Alzheimer's Disease  Allergic/Immunologic: (-) allergies latex, (-) allergies metal, (-) skin sensitivity. Hematlogic: (-) anemia, (-) blood transfusion, (-) DVT/PE, (-) Clotting disorders    Constitutional:  The patient is alert and oriented x 3, appears to be stated age and in no distress. Temp 98.5 °F (36.9 °C)   Ht 5' 6\" (1.676 m)   Wt 168 lb (76.2 kg)   BMI 27.12 kg/m²     Skin:  Upon inspection: the skin appears warm, dry and intact. There is not a previous scar over the affected area. There is not any cellulitis, lymphedema or cutaneous lesions noted in the lower extremities. Upon palpation there is no induration noted. Neurologic:  Gait: patient has one leg and is using crutches; Motor exam of the lower extremities show ; quadriceps, hamstrings, foot dorsi and plantar flexors intact R.  5/5 and L. 5/5. Deep tendon reflexes are 2/4 at the knees and 2/4 at the ankles with strong extensor hallicus longus motor strength bilaterally. Sensory to both feet is intact to all sensory roots. Cardiovascular: The vascular exam is normal and is well perfused to distal extremities. Distal pulses DP/PT: R. 2+; L. 2+. There is cap refill noted less than two seconds in all digits. There is not edema of the bilateral lower extremities. There is not varicosities noted in the distal extremities. Lymph:  Upon palpation,  there is no lymphadenopathy noted in bilateral lower extremities. Musculoskeletal:  Gait: using crutches; examination of the nails and digits reveal no cyanosis or clubbing. Lumbar exam:  On visual inspection, there is not deformity of the spine. full range of motion, no tenderness, palpable spasm or pain on motion. Special tests: Straight Leg Raise negative, Kyree testnegative. Hip exam:  Upon visual inspection there is not a deformity noted.  Patient complains of tenderness at the  greater trochanter

## 2021-06-01 ENCOUNTER — TELEPHONE (OUTPATIENT)
Dept: ORTHOPEDIC SURGERY | Age: 66
End: 2021-06-01

## 2021-06-01 NOTE — TELEPHONE ENCOUNTER
Last appointment Visit 5/25/2021  Next appointment No future appointments.    Last refill 05/25/2021  DOS: N/A       Patient called in requesting refill of HYDROcodone-acetaminophen (Bertrum Och) 5-325 MG per tablet    To  [address], [place] - [address] [place] [address] [phone] - F [phone]   [place] [address] [address] [place] [address]

## 2021-08-24 ENCOUNTER — OFFICE VISIT (OUTPATIENT)
Dept: ORTHOPEDIC SURGERY | Age: 66
End: 2021-08-24
Payer: MEDICARE

## 2021-08-24 VITALS — HEIGHT: 66 IN | BODY MASS INDEX: 27 KG/M2 | WEIGHT: 168 LBS

## 2021-08-24 DIAGNOSIS — M70.62 TROCHANTERIC BURSITIS OF LEFT HIP: Primary | ICD-10-CM

## 2021-08-24 PROCEDURE — 20610 DRAIN/INJ JOINT/BURSA W/O US: CPT | Performed by: ORTHOPAEDIC SURGERY

## 2021-08-24 PROCEDURE — 4040F PNEUMOC VAC/ADMIN/RCVD: CPT | Performed by: ORTHOPAEDIC SURGERY

## 2021-08-24 PROCEDURE — 3017F COLORECTAL CA SCREEN DOC REV: CPT | Performed by: ORTHOPAEDIC SURGERY

## 2021-08-24 PROCEDURE — G8417 CALC BMI ABV UP PARAM F/U: HCPCS | Performed by: ORTHOPAEDIC SURGERY

## 2021-08-24 PROCEDURE — G8427 DOCREV CUR MEDS BY ELIG CLIN: HCPCS | Performed by: ORTHOPAEDIC SURGERY

## 2021-08-24 PROCEDURE — 1123F ACP DISCUSS/DSCN MKR DOCD: CPT | Performed by: ORTHOPAEDIC SURGERY

## 2021-08-24 PROCEDURE — 4004F PT TOBACCO SCREEN RCVD TLK: CPT | Performed by: ORTHOPAEDIC SURGERY

## 2021-08-24 RX ORDER — HYDROCODONE BITARTRATE AND ACETAMINOPHEN 5; 325 MG/1; MG/1
1 TABLET ORAL EVERY 6 HOURS PRN
Qty: 10 TABLET | Refills: 0 | Status: SHIPPED | OUTPATIENT
Start: 2021-08-24 | End: 2021-08-31

## 2021-08-24 RX ORDER — TRIAMCINOLONE ACETONIDE 40 MG/ML
40 INJECTION, SUSPENSION INTRA-ARTICULAR; INTRAMUSCULAR ONCE
Status: COMPLETED | OUTPATIENT
Start: 2021-08-24 | End: 2021-08-24

## 2021-08-24 RX ADMIN — TRIAMCINOLONE ACETONIDE 40 MG: 40 INJECTION, SUSPENSION INTRA-ARTICULAR; INTRAMUSCULAR at 16:38

## 2021-08-24 NOTE — PROGRESS NOTES
Chief Complaint   Patient presents with    Follow-up     left hip f/u       Yamila Aranda  Is a 73 y.o. male  is following up today with left hip pain. Patient states pain level is a 10/10. He has tried none.     Past Medical History:   Diagnosis Date    Amputation of leg ([address]     Chest tightness 07/23/2017    lexiscan stress test    Depression     Hypertension      Past Surgical History:   Procedure Laterality Date    ABOVE KNEE AMPUTATION      UPPER GASTROINTESTINAL ENDOSCOPY N/A 8/2/2020    EGD CONTROL HEMORRHAGE performed by Terrell Krishnamurthy DO at [place] [address]  8/2/2020    EGD BIOPSY performed by Terrell Krishnamurthy DO at [place] [address]  8/2/2020    EGD ESOPHAGOGASTRODUODENOSCOPY SCLEROTHERAPY performed by Terrell Krishnamurthy DO at [place] [address]       Current Outpatient Medications:     aspirin 81 MG chewable tablet, Take 1 tablet by mouth daily, Disp: 30 tablet, Rfl: 3    tamsulosin (FLOMAX) 0.4 MG capsule, Take 1 capsule by mouth daily, Disp: 30 capsule, Rfl: 0    Fluticasone Furoate-Vilanterol (BREO ELLIPTA IN), Inhale 1 puff into the lungs daily Pt is unsure of dose, Disp: , Rfl:     pantoprazole (PROTONIX) 40 MG tablet, Take 1 tablet by mouth 2 times daily (before meals), Disp: 120 tablet, Rfl: 0  Allergies   Allergen Reactions    Codeine Itching    Clindamycin/Lincomycin Rash     Social History     Socioeconomic History    Marital status:      Spouse name: Not on file    Number of children: Not on file    Years of education: Not on file    Highest education level: Not on file   Occupational History    Not on file   Tobacco Use    Smoking status: Current Every Day Smoker     Types: Cigars    Smokeless tobacco: Never Used    Tobacco comment: cigars   Substance and Sexual Activity    Alcohol use: Never    Drug use: Yes     Types: Marijuana    Sexual activity: Not on file   Other Topics Concern    Not on file Social History Narrative    Not on file     Social Determinants of Health     Financial Resource Strain:     Difficulty of Paying Living Expenses:    Food Insecurity:     Worried About Running Out of Food in the Last Year:     [address] in the Last Year:    Transportation Needs:     Lack of Transportation (Medical):  Lack of Transportation (Non-Medical):    Physical Activity:     Days of Exercise per Week:     Minutes of Exercise per Session:    Stress:     Feeling of Stress :    Social Connections:     Frequency of Communication with Friends and Family:     Frequency of Social Gatherings with Friends and Family:     Attends Spiritism Services:     Active Member of Clubs or Organizations:     Attends Club or Organization Meetings:     Marital Status:    Intimate Partner Violence:     Fear of Current or Ex-Partner:     Emotionally Abused:     Physically Abused:     Sexually Abused:      No family history on file. REVIEW OF SYSTEMS:     General/Constitution:  (-)weight loss, (-)fever, (-)chills, (-)weakness. Skin: (-) rash,(-) psoriasis,(-) eczema, (-)skin cancer. Musculoskeletal: (-) fractures,  (-) dislocations,(-) collagen vascular disease, (-) fibromyalgia, (-) multiple sclerosis, (-) muscular dystrophy, (-) RSD,(-) joint pain (-)swelling, (-) joint pain,swelling. Neurologic: (-) epilepsy, (-)seizures,(-) brain tumor,(-) TIA, (-)stroke, (-)headaches, (-)Parkinson disease,(-) memory loss, (-) LOC. Cardiovascular: (-) Chest pain, (-) swelling in legs/feet, (-) SOB, (-) cramping in legs/feet with walking. Respiratory: (-) SOB, (-) Coughing, (-) night sweats. GI: (-) nausea, (-) vomiting, (-) diarrhea, (-) blood in stool, (-) gastric ulcer. Psychiatric: (-) Depression, (-) Anxiety, (-) bipolar disease, (-) Alzheimer's Disease  Allergic/Immunologic: (-) allergies latex, (-) allergies metal, (-) skin sensitivity.   Hematlogic: (-) anemia, (-) blood transfusion, (-) DVT/PE, (-) Clotting disorders    Constitutional:  The patient is alert and oriented x 3, appears to be stated age and in no distress. Ht 5' 6\" (1.676 m)   Wt 168 lb (76.2 kg)   BMI 27.12 kg/m²     Skin:  Upon inspection: the skin appears warm, dry and intact. There is not a previous scar over the affected area. There is not any cellulitis, lymphedema or cutaneous lesions noted in the lower extremities. Upon palpation there is no induration noted. Neurologic:  Gait: patient has one leg and is using crutches; Motor exam of the lower extremities show ; quadriceps, hamstrings, foot dorsi and plantar flexors intact R.  5/5 and L. 5/5. Deep tendon reflexes are 2/4 at the knees and 2/4 at the ankles with strong extensor hallicus longus motor strength bilaterally. Sensory to both feet is intact to all sensory roots. Cardiovascular: The vascular exam is normal and is well perfused to distal extremities. Distal pulses DP/PT: R. 2+; L. 2+. There is cap refill noted less than two seconds in all digits. There is not edema of the bilateral lower extremities. There is not varicosities noted in the distal extremities. Lymph:  Upon palpation,  there is no lymphadenopathy noted in bilateral lower extremities. Musculoskeletal:  Gait: using crutches; examination of the nails and digits reveal no cyanosis or clubbing. Lumbar exam:  On visual inspection, there is not deformity of the spine. full range of motion, no tenderness, palpable spasm or pain on motion. Special tests: Straight Leg Raise negative, Kyree testnegative. Hip exam:  Upon visual inspection there is not a deformity noted. Patient complains of tenderness at the  greater trochanter and lateral hip. Exam of the left hip shows none leg length discrepancy.   Range of motion of the involved/uninvolved hip is 25 degrees internal rotation and 35 degrees external rotation/25 degrees internal rotation and 35 degrees external rotation, the hip joint is

## (undated) DEVICE — NEEDLE SCLERO 25GA L240CM OD0.51MM ID0.24MM EXTN L4MM SHTH

## (undated) DEVICE — MASK,FACE,MAXFLUIDPROTECT,SHIELD/ERLPS: Brand: MEDLINE

## (undated) DEVICE — CATHETER ELECHEMSTAS 7FR L300CM CHN 2.8MM STD CONN DISP G

## (undated) DEVICE — SPONGE GZ 4IN 4IN 4 PLY N WVN AVANT

## (undated) DEVICE — LUBRICANT SURG JELLY ST BACTER TUBE 4.25OZ

## (undated) DEVICE — CLIPPING DEVICE: Brand: RESOLUTION CLIP

## (undated) DEVICE — FORCEPS BX L240CM JAW DIA2.8MM L CAP W/ NDL MIC MESH TOOTH

## (undated) DEVICE — CONTAINER SPEC COLL 960ML POLYPR TRIANG GRAD INTAKE/OUTPUT

## (undated) DEVICE — KIT BEDSIDE REVITAL OX 500ML

## (undated) DEVICE — Device: Brand: DEFENDO VALVE AND CONNECTOR KIT

## (undated) DEVICE — TUBING, SUCTION, 1/4" X 10', STRAIGHT: Brand: MEDLINE

## (undated) DEVICE — YANKAUER,BULB TIP,W/O VENT,RIGID,STERILE: Brand: MEDLINE

## (undated) DEVICE — GOWN ISOLATN REG YEL M WT MULTIPLY SIDETIE LEV 2

## (undated) DEVICE — KENDALL 450 SERIES MONITORING FOAM ELECTRODE - RECTANGULAR SHAPE ( 3/PK): Brand: KENDALL

## (undated) DEVICE — 6 X 9  1.75MIL 4-WALL LABGUARD: Brand: MINIGRIP COMMERCIAL LLC

## (undated) DEVICE — BLOCK BITE 60FR CAREGUARD